# Patient Record
Sex: MALE | Race: WHITE | NOT HISPANIC OR LATINO | URBAN - METROPOLITAN AREA
[De-identification: names, ages, dates, MRNs, and addresses within clinical notes are randomized per-mention and may not be internally consistent; named-entity substitution may affect disease eponyms.]

---

## 2017-03-03 ENCOUNTER — OUTPATIENT (OUTPATIENT)
Dept: INPATIENT UNIT | Facility: HOSPITAL | Age: 57
LOS: 1 days | End: 2017-03-03
Payer: COMMERCIAL

## 2017-03-03 ENCOUNTER — TRANSCRIPTION ENCOUNTER (OUTPATIENT)
Age: 57
End: 2017-03-03

## 2017-03-03 VITALS
WEIGHT: 220.02 LBS | DIASTOLIC BLOOD PRESSURE: 87 MMHG | HEART RATE: 71 BPM | TEMPERATURE: 99 F | RESPIRATION RATE: 18 BRPM | SYSTOLIC BLOOD PRESSURE: 160 MMHG | HEIGHT: 66 IN | OXYGEN SATURATION: 99 %

## 2017-03-03 DIAGNOSIS — Z45.02 ENCOUNTER FOR ADJUSTMENT AND MANAGEMENT OF AUTOMATIC IMPLANTABLE CARDIAC DEFIBRILLATOR: ICD-10-CM

## 2017-03-03 LAB
ALBUMIN SERPL ELPH-MCNC: 4.3 G/DL — SIGNIFICANT CHANGE UP (ref 3.3–5)
ALP SERPL-CCNC: 47 U/L — SIGNIFICANT CHANGE UP (ref 40–120)
ALT FLD-CCNC: 22 U/L RC — SIGNIFICANT CHANGE UP (ref 10–45)
ANION GAP SERPL CALC-SCNC: 15 MMOL/L — SIGNIFICANT CHANGE UP (ref 5–17)
AST SERPL-CCNC: 22 U/L — SIGNIFICANT CHANGE UP (ref 10–40)
BILIRUB SERPL-MCNC: 0.5 MG/DL — SIGNIFICANT CHANGE UP (ref 0.2–1.2)
BUN SERPL-MCNC: 20 MG/DL — SIGNIFICANT CHANGE UP (ref 7–23)
CALCIUM SERPL-MCNC: 9.1 MG/DL — SIGNIFICANT CHANGE UP (ref 8.4–10.5)
CHLORIDE SERPL-SCNC: 106 MMOL/L — SIGNIFICANT CHANGE UP (ref 96–108)
CO2 SERPL-SCNC: 23 MMOL/L — SIGNIFICANT CHANGE UP (ref 22–31)
CREAT SERPL-MCNC: 1.29 MG/DL — SIGNIFICANT CHANGE UP (ref 0.5–1.3)
GLUCOSE SERPL-MCNC: 101 MG/DL — HIGH (ref 70–99)
HCT VFR BLD CALC: 40.6 % — SIGNIFICANT CHANGE UP (ref 39–50)
HGB BLD-MCNC: 13.9 G/DL — SIGNIFICANT CHANGE UP (ref 13–17)
MCHC RBC-ENTMCNC: 28.6 PG — SIGNIFICANT CHANGE UP (ref 27–34)
MCHC RBC-ENTMCNC: 34.3 GM/DL — SIGNIFICANT CHANGE UP (ref 32–36)
MCV RBC AUTO: 83.6 FL — SIGNIFICANT CHANGE UP (ref 80–100)
PLATELET # BLD AUTO: 144 K/UL — LOW (ref 150–400)
POTASSIUM SERPL-MCNC: 3.7 MMOL/L — SIGNIFICANT CHANGE UP (ref 3.5–5.3)
POTASSIUM SERPL-SCNC: 3.7 MMOL/L — SIGNIFICANT CHANGE UP (ref 3.5–5.3)
PROT SERPL-MCNC: 6.9 G/DL — SIGNIFICANT CHANGE UP (ref 6–8.3)
RBC # BLD: 4.86 M/UL — SIGNIFICANT CHANGE UP (ref 4.2–5.8)
RBC # FLD: 12.8 % — SIGNIFICANT CHANGE UP (ref 10.3–14.5)
SODIUM SERPL-SCNC: 144 MMOL/L — SIGNIFICANT CHANGE UP (ref 135–145)
WBC # BLD: 6.3 K/UL — SIGNIFICANT CHANGE UP (ref 3.8–10.5)
WBC # FLD AUTO: 6.3 K/UL — SIGNIFICANT CHANGE UP (ref 3.8–10.5)

## 2017-03-03 PROCEDURE — 33228 REMV&REPLC PM GEN DUAL LEAD: CPT

## 2017-03-03 PROCEDURE — 93010 ELECTROCARDIOGRAM REPORT: CPT

## 2017-03-03 RX ORDER — ATORVASTATIN CALCIUM 80 MG/1
10 TABLET, FILM COATED ORAL AT BEDTIME
Qty: 0 | Refills: 0 | Status: DISCONTINUED | OUTPATIENT
Start: 2017-03-03 | End: 2017-03-04

## 2017-03-03 RX ORDER — ACETAMINOPHEN 500 MG
2 TABLET ORAL
Qty: 0 | Refills: 0 | COMMUNITY
Start: 2017-03-03

## 2017-03-03 RX ORDER — PANTOPRAZOLE SODIUM 20 MG/1
40 TABLET, DELAYED RELEASE ORAL
Qty: 0 | Refills: 0 | Status: DISCONTINUED | OUTPATIENT
Start: 2017-03-03 | End: 2017-03-04

## 2017-03-03 RX ORDER — CEPHALEXIN 500 MG
1 CAPSULE ORAL
Qty: 3 | Refills: 0 | OUTPATIENT
Start: 2017-03-03 | End: 2017-03-04

## 2017-03-03 RX ORDER — SODIUM CHLORIDE 9 MG/ML
3 INJECTION INTRAMUSCULAR; INTRAVENOUS; SUBCUTANEOUS EVERY 8 HOURS
Qty: 0 | Refills: 0 | Status: DISCONTINUED | OUTPATIENT
Start: 2017-03-03 | End: 2017-03-04

## 2017-03-03 RX ORDER — CEFAZOLIN SODIUM 1 G
1000 VIAL (EA) INJECTION EVERY 8 HOURS
Qty: 0 | Refills: 0 | Status: COMPLETED | OUTPATIENT
Start: 2017-03-04 | End: 2017-03-04

## 2017-03-03 RX ORDER — ACETAMINOPHEN 500 MG
650 TABLET ORAL EVERY 6 HOURS
Qty: 0 | Refills: 0 | Status: DISCONTINUED | OUTPATIENT
Start: 2017-03-03 | End: 2017-03-04

## 2017-03-03 RX ORDER — INFLUENZA VIRUS VACCINE 15; 15; 15; 15 UG/.5ML; UG/.5ML; UG/.5ML; UG/.5ML
0.5 SUSPENSION INTRAMUSCULAR ONCE
Qty: 0 | Refills: 0 | Status: COMPLETED | OUTPATIENT
Start: 2017-03-03 | End: 2017-03-03

## 2017-03-03 RX ADMIN — SODIUM CHLORIDE 3 MILLILITER(S): 9 INJECTION INTRAMUSCULAR; INTRAVENOUS; SUBCUTANEOUS at 21:39

## 2017-03-03 RX ADMIN — ATORVASTATIN CALCIUM 10 MILLIGRAM(S): 80 TABLET, FILM COATED ORAL at 22:42

## 2017-03-03 NOTE — H&P CARDIOLOGY - PMH
Arthritis  Family history of coronary artery disease  Allergic asthma  GERD (gastroesophageal reflux disease)  Bradycardia  Pacemaker  Syncope

## 2017-03-03 NOTE — DISCHARGE NOTE ADULT - CARE PROVIDER_API CALL
Elroy Mauricio), Cardiac Electrophysiology; Cardiology  [address] [place] [address]  Phone: [phone]  Fax: [phone]

## 2017-03-03 NOTE — DISCHARGE NOTE ADULT - CARE PROVIDERS DIRECT ADDRESSES
[email or web address]

## 2017-03-03 NOTE — DISCHARGE NOTE ADULT - CARE PLAN
Principal Discharge DX:	Pacemaker battery depletion  Goal:	PPM generator changed  Instructions for follow-up, activity and diet:	No lifting of affected arm over your head on the side of pacemaker for 2 weeks. No lifting / pushing more than 10 lbs for 6 weeks. No driving until you have your follow up appointment. Keep area dry for 5-7 days, then you may shower, but do not scrub the area. No submerging / swimming until the scar is formed. No golf / swimming / tennis for 6 weeks or until you are cleared by your physcian.  Do not take NSAIDS for pain, this includes Advil, Motrin, Aleve, Ibuprofen, Naprosyn as these can cause bleeding. Take Tylenol for pain as needed per label instructions. Please call [phone] for follow up appointment in 1-2 weeks after discharge. Principal Discharge DX:	Pacemaker battery depletion  Goal:	PPM generator changed  Instructions for follow-up, activity and diet:	No lifting of affected arm over your head on the side of pacemaker for 2 weeks. No lifting / pushing more than 10 lbs for 6 weeks. No driving until you have your follow up appointment. Keep area dry for 5-7 days, then you may shower, but do not scrub the area. No submerging / swimming until the scar is formed. No golf / swimming / tennis for 6 weeks or until you are cleared by your physcian.  Do not take NSAIDS for pain, this includes Advil, Motrin, Aleve, Ibuprofen, Naprosyn as these can cause bleeding. Take Tylenol for pain as needed per label instructions. Please call [phone] for follow up appointment in 1-2 weeks after discharge. Principal Discharge DX:	Pacemaker battery depletion  Goal:	PPM generator changed  Instructions for follow-up, activity and diet:	No lifting of affected arm over your head on the side of pacemaker for 2 weeks. No lifting / pushing more than 10 lbs for 6 weeks. No driving until you have your follow up appointment. Keep area dry for 5-7 days, then you may shower, but do not scrub the area. No submerging / swimming until the scar is formed. No golf / swimming / tennis for 6 weeks or until you are cleared by your physcian.  Do not take NSAIDS for pain, this includes Advil, Motrin, Aleve, Ibuprofen, Naprosyn as these can cause bleeding. Take Tylenol for pain as needed per label instructions. Please call [phone] for follow up appointment in 1-2 weeks after discharge.

## 2017-03-03 NOTE — DISCHARGE NOTE ADULT - HOSPITAL COURSE
57 year old obese male pt with PMHx syncope s/p PPM, FH CAD, GERD, Allergic Asthma, here for PPM generator change. Pt denies dizziness or light headed.   Pt s/p PPM generator change via L upper chest wall. Pt tolerated procedure well with no post complications and hospitalization remained uneventful. Pt is hemodynamically stable and insertion/incision site benign. No c/o chest pain or SOB. Discharge teaching provided to Pt/caretaker and verbalized understanding the instruction. Pt is stable for discharge home as per attending. 57 year old obese male pt with PMHx syncope s/p PPM, FH CAD, GERD, Allergic Asthma, here for PPM generator change. Pt denies dizziness or light headed.   Pt s/p PPM generator change via L upper chest wall. Pt tolerated procedure well with no post complications and hospitalization remained uneventful. Pt is hemodynamically stable and insertion/incision site benign. No c/o chest pain or SOB. Discharge teaching provided to Pt/caretaker and verbalized understanding the instruction. Pt is stable for discharge home as per attending Dr Mauricio

## 2017-03-03 NOTE — DISCHARGE NOTE ADULT - NS AS ACTIVITY OBS
No Heavy lifting/straining/Do not drive or operate machinery Walking-Indoors allowed/Do not drive or operate machinery/No Heavy lifting/straining

## 2017-03-03 NOTE — DISCHARGE NOTE ADULT - MEDICATION SUMMARY - MEDICATIONS TO TAKE
I will START or STAY ON the medications listed below when I get home from the hospital:    acetaminophen 325 mg oral tablet  -- 2 tab(s) by mouth every 6 hours, As needed, Mild Pain (1 - 3)  -- Indication: For pain    pravastatin 10 mg oral tablet  -- 1 tab(s) by mouth once a day  -- Indication: For hld    cephalexin 500 mg oral capsule  -- 1 cap(s) by mouth every 8 hours  -- Finish all this medication unless otherwise directed by prescriber.    -- Indication: For atb    Protonix 40 mg oral delayed release tablet  -- 1 tab(s) by mouth once a day  -- Indication: For home med I will START or STAY ON the medications listed below when I get home from the hospital:    acetaminophen 325 mg oral tablet  -- 2 tab(s) by mouth every 6 hours, As needed, Mild Pain (1 - 3)  -- Indication: For pain    pravastatin 10 mg oral tablet  -- 1 tab(s) by mouth once a day  -- Indication: For hld    cephalexin 500 mg oral capsule  -- 1 cap(s) by mouth every 8 hours  -- Finish all this medication unless otherwise directed by prescriber.    -- Indication: For antibiotic    Protonix 40 mg oral delayed release tablet  -- 1 tab(s) by mouth once a day  -- Indication: For home med

## 2017-03-03 NOTE — DISCHARGE NOTE ADULT - PLAN OF CARE
PPM generator changed No lifting of affected arm over your head on the side of pacemaker for 2 weeks. No lifting / pushing more than 10 lbs for 6 weeks. No driving until you have your follow up appointment. Keep area dry for 5-7 days, then you may shower, but do not scrub the area. No submerging / swimming until the scar is formed. No golf / swimming / tennis for 6 weeks or until you are cleared by your physcian.  Do not take NSAIDS for pain, this includes Advil, Motrin, Aleve, Ibuprofen, Naprosyn as these can cause bleeding. Take Tylenol for pain as needed per label instructions. Please call [phone] for follow up appointment in 1-2 weeks after discharge.

## 2017-03-04 VITALS
OXYGEN SATURATION: 98 % | DIASTOLIC BLOOD PRESSURE: 83 MMHG | SYSTOLIC BLOOD PRESSURE: 143 MMHG | RESPIRATION RATE: 18 BRPM | TEMPERATURE: 99 F | HEART RATE: 63 BPM

## 2017-03-04 LAB
ANION GAP SERPL CALC-SCNC: 13 MMOL/L — SIGNIFICANT CHANGE UP (ref 5–17)
BUN SERPL-MCNC: 22 MG/DL — SIGNIFICANT CHANGE UP (ref 7–23)
CALCIUM SERPL-MCNC: 8.8 MG/DL — SIGNIFICANT CHANGE UP (ref 8.4–10.5)
CHLORIDE SERPL-SCNC: 106 MMOL/L — SIGNIFICANT CHANGE UP (ref 96–108)
CO2 SERPL-SCNC: 25 MMOL/L — SIGNIFICANT CHANGE UP (ref 22–31)
CREAT SERPL-MCNC: 1.42 MG/DL — HIGH (ref 0.5–1.3)
GLUCOSE SERPL-MCNC: 118 MG/DL — HIGH (ref 70–99)
HCT VFR BLD CALC: 38.2 % — LOW (ref 39–50)
HCT VFR BLD CALC: 39.3 % — SIGNIFICANT CHANGE UP (ref 39–50)
HGB BLD-MCNC: 12.9 G/DL — LOW (ref 13–17)
HGB BLD-MCNC: 13.3 G/DL — SIGNIFICANT CHANGE UP (ref 13–17)
MCHC RBC-ENTMCNC: 28.5 PG — SIGNIFICANT CHANGE UP (ref 27–34)
MCHC RBC-ENTMCNC: 28.5 PG — SIGNIFICANT CHANGE UP (ref 27–34)
MCHC RBC-ENTMCNC: 33.9 GM/DL — SIGNIFICANT CHANGE UP (ref 32–36)
MCHC RBC-ENTMCNC: 33.9 GM/DL — SIGNIFICANT CHANGE UP (ref 32–36)
MCV RBC AUTO: 84.1 FL — SIGNIFICANT CHANGE UP (ref 80–100)
MCV RBC AUTO: 84.1 FL — SIGNIFICANT CHANGE UP (ref 80–100)
PLATELET # BLD AUTO: 140 K/UL — LOW (ref 150–400)
PLATELET # BLD AUTO: 143 K/UL — LOW (ref 150–400)
POTASSIUM SERPL-MCNC: 4 MMOL/L — SIGNIFICANT CHANGE UP (ref 3.5–5.3)
POTASSIUM SERPL-SCNC: 4 MMOL/L — SIGNIFICANT CHANGE UP (ref 3.5–5.3)
RBC # BLD: 4.54 M/UL — SIGNIFICANT CHANGE UP (ref 4.2–5.8)
RBC # BLD: 4.67 M/UL — SIGNIFICANT CHANGE UP (ref 4.2–5.8)
RBC # FLD: 13 % — SIGNIFICANT CHANGE UP (ref 10.3–14.5)
RBC # FLD: 13 % — SIGNIFICANT CHANGE UP (ref 10.3–14.5)
SODIUM SERPL-SCNC: 144 MMOL/L — SIGNIFICANT CHANGE UP (ref 135–145)
WBC # BLD: 5.9 K/UL — SIGNIFICANT CHANGE UP (ref 3.8–10.5)
WBC # BLD: 7 K/UL — SIGNIFICANT CHANGE UP (ref 3.8–10.5)
WBC # FLD AUTO: 5.9 K/UL — SIGNIFICANT CHANGE UP (ref 3.8–10.5)
WBC # FLD AUTO: 7 K/UL — SIGNIFICANT CHANGE UP (ref 3.8–10.5)

## 2017-03-04 PROCEDURE — 80048 BASIC METABOLIC PNL TOTAL CA: CPT

## 2017-03-04 PROCEDURE — C1785: CPT

## 2017-03-04 PROCEDURE — 33228 REMV&REPLC PM GEN DUAL LEAD: CPT

## 2017-03-04 PROCEDURE — 93005 ELECTROCARDIOGRAM TRACING: CPT

## 2017-03-04 PROCEDURE — 93010 ELECTROCARDIOGRAM REPORT: CPT

## 2017-03-04 PROCEDURE — 85027 COMPLETE CBC AUTOMATED: CPT

## 2017-03-04 PROCEDURE — 80053 COMPREHEN METABOLIC PANEL: CPT

## 2017-03-04 RX ADMIN — PANTOPRAZOLE SODIUM 40 MILLIGRAM(S): 20 TABLET, DELAYED RELEASE ORAL at 05:33

## 2017-03-04 RX ADMIN — Medication 100 MILLIGRAM(S): at 09:26

## 2017-03-04 RX ADMIN — Medication 100 MILLIGRAM(S): at 00:38

## 2017-03-04 RX ADMIN — SODIUM CHLORIDE 3 MILLILITER(S): 9 INJECTION INTRAMUSCULAR; INTRAVENOUS; SUBCUTANEOUS at 05:30

## 2017-03-04 RX ADMIN — SODIUM CHLORIDE 3 MILLILITER(S): 9 INJECTION INTRAMUSCULAR; INTRAVENOUS; SUBCUTANEOUS at 14:32

## 2017-03-06 RX ORDER — PANTOPRAZOLE SODIUM 20 MG/1
1 TABLET, DELAYED RELEASE ORAL
Qty: 0 | Refills: 0 | COMMUNITY

## 2017-03-13 ENCOUNTER — APPOINTMENT (OUTPATIENT)
Dept: ORTHOPEDIC SURGERY | Facility: CLINIC | Age: 57
End: 2017-03-13

## 2017-03-17 ENCOUNTER — APPOINTMENT (OUTPATIENT)
Dept: ELECTROPHYSIOLOGY | Facility: CLINIC | Age: 57
End: 2017-03-17

## 2017-03-17 ENCOUNTER — NON-APPOINTMENT (OUTPATIENT)
Age: 57
End: 2017-03-17

## 2017-03-17 VITALS
BODY MASS INDEX: 35.36 KG/M2 | DIASTOLIC BLOOD PRESSURE: 88 MMHG | HEART RATE: 65 BPM | OXYGEN SATURATION: 98 % | SYSTOLIC BLOOD PRESSURE: 154 MMHG | WEIGHT: 220 LBS | HEIGHT: 66 IN

## 2017-03-17 DIAGNOSIS — I44.2 ATRIOVENTRICULAR BLOCK, COMPLETE: ICD-10-CM

## 2017-03-17 DIAGNOSIS — Z95.0 PRESENCE OF CARDIAC PACEMAKER: ICD-10-CM

## 2017-11-07 ENCOUNTER — APPOINTMENT (OUTPATIENT)
Dept: ORTHOPEDIC SURGERY | Facility: CLINIC | Age: 57
End: 2017-11-07

## 2017-11-30 ENCOUNTER — APPOINTMENT (OUTPATIENT)
Dept: RHEUMATOLOGY | Facility: CLINIC | Age: 57
End: 2017-11-30
Payer: COMMERCIAL

## 2017-11-30 VITALS
OXYGEN SATURATION: 98 % | BODY MASS INDEX: 35.36 KG/M2 | SYSTOLIC BLOOD PRESSURE: 135 MMHG | WEIGHT: 220 LBS | HEART RATE: 66 BPM | TEMPERATURE: 98.8 F | HEIGHT: 66 IN | DIASTOLIC BLOOD PRESSURE: 77 MMHG

## 2017-11-30 DIAGNOSIS — Z78.9 OTHER SPECIFIED HEALTH STATUS: ICD-10-CM

## 2017-11-30 DIAGNOSIS — Z86.39 PERSONAL HISTORY OF OTHER ENDOCRINE, NUTRITIONAL AND METABOLIC DISEASE: ICD-10-CM

## 2017-11-30 PROCEDURE — 99204 OFFICE O/P NEW MOD 45 MIN: CPT

## 2017-11-30 RX ORDER — TADALAFIL 5 MG/1
5 TABLET, FILM COATED ORAL
Refills: 0 | Status: ACTIVE | COMMUNITY

## 2018-03-06 ENCOUNTER — APPOINTMENT (OUTPATIENT)
Dept: RHEUMATOLOGY | Facility: CLINIC | Age: 58
End: 2018-03-06
Payer: COMMERCIAL

## 2018-03-06 VITALS
OXYGEN SATURATION: 96 % | SYSTOLIC BLOOD PRESSURE: 154 MMHG | DIASTOLIC BLOOD PRESSURE: 94 MMHG | WEIGHT: 220 LBS | BODY MASS INDEX: 35.36 KG/M2 | HEART RATE: 75 BPM | HEIGHT: 66 IN

## 2018-03-06 PROCEDURE — 99214 OFFICE O/P EST MOD 30 MIN: CPT

## 2018-05-01 ENCOUNTER — APPOINTMENT (OUTPATIENT)
Dept: RHEUMATOLOGY | Facility: CLINIC | Age: 58
End: 2018-05-01
Payer: COMMERCIAL

## 2018-05-01 VITALS
HEART RATE: 65 BPM | WEIGHT: 220 LBS | BODY MASS INDEX: 35.36 KG/M2 | OXYGEN SATURATION: 95 % | DIASTOLIC BLOOD PRESSURE: 84 MMHG | SYSTOLIC BLOOD PRESSURE: 140 MMHG | HEIGHT: 66 IN

## 2018-05-01 DIAGNOSIS — M79.1 MYALGIA: ICD-10-CM

## 2018-05-01 DIAGNOSIS — M75.90 SHOULDER LESION, UNSPECIFIED, UNSPECIFIED SHOULDER: ICD-10-CM

## 2018-05-01 PROCEDURE — 99214 OFFICE O/P EST MOD 30 MIN: CPT

## 2018-05-01 RX ORDER — SIMVASTATIN 10 MG/1
10 TABLET, FILM COATED ORAL
Refills: 0 | Status: DISCONTINUED | COMMUNITY
End: 2018-05-01

## 2018-05-01 RX ORDER — SUCRALFATE 1 G/1
1 TABLET ORAL
Qty: 120 | Refills: 0 | Status: DISCONTINUED | COMMUNITY
Start: 2017-11-15

## 2018-05-01 RX ORDER — AMOXICILLIN AND CLAVULANATE POTASSIUM 875; 125 MG/1; MG/1
875-125 TABLET, COATED ORAL
Qty: 20 | Refills: 0 | Status: COMPLETED | COMMUNITY
Start: 2018-02-01

## 2018-06-28 ENCOUNTER — APPOINTMENT (OUTPATIENT)
Dept: RHEUMATOLOGY | Facility: CLINIC | Age: 58
End: 2018-06-28
Payer: COMMERCIAL

## 2018-06-28 VITALS
SYSTOLIC BLOOD PRESSURE: 152 MMHG | WEIGHT: 220 LBS | BODY MASS INDEX: 35.36 KG/M2 | HEART RATE: 72 BPM | HEIGHT: 66 IN | DIASTOLIC BLOOD PRESSURE: 90 MMHG | OXYGEN SATURATION: 95 %

## 2018-06-28 DIAGNOSIS — M25.561 PAIN IN RIGHT KNEE: ICD-10-CM

## 2018-06-28 DIAGNOSIS — M25.562 PAIN IN RIGHT KNEE: ICD-10-CM

## 2018-06-28 PROCEDURE — 20610 DRAIN/INJ JOINT/BURSA W/O US: CPT | Mod: LT

## 2018-06-28 PROCEDURE — 99213 OFFICE O/P EST LOW 20 MIN: CPT | Mod: 25

## 2018-06-28 RX ORDER — METHYLPREDNISOLONE ACETATE 80 MG/ML
80 INJECTION, SUSPENSION INTRA-ARTICULAR; INTRALESIONAL; INTRAMUSCULAR; SOFT TISSUE
Refills: 0 | Status: COMPLETED | OUTPATIENT
Start: 2018-06-28

## 2018-06-28 RX ADMIN — METHYLPREDNISOLONE ACETATE MG/ML: 80 INJECTION, SUSPENSION INTRA-ARTICULAR; INTRALESIONAL; INTRAMUSCULAR; SOFT TISSUE at 00:00

## 2018-08-07 ENCOUNTER — APPOINTMENT (OUTPATIENT)
Dept: RHEUMATOLOGY | Facility: CLINIC | Age: 58
End: 2018-08-07
Payer: COMMERCIAL

## 2018-08-07 VITALS
HEIGHT: 66 IN | SYSTOLIC BLOOD PRESSURE: 150 MMHG | DIASTOLIC BLOOD PRESSURE: 91 MMHG | HEART RATE: 71 BPM | OXYGEN SATURATION: 97 % | WEIGHT: 220 LBS | BODY MASS INDEX: 35.36 KG/M2

## 2018-08-07 PROCEDURE — 99214 OFFICE O/P EST MOD 30 MIN: CPT

## 2018-08-07 RX ORDER — KETOCONAZOLE 20 MG/G
2 CREAM TOPICAL
Qty: 30 | Refills: 0 | Status: DISCONTINUED | COMMUNITY
Start: 2018-03-21 | End: 2018-08-07

## 2018-08-07 RX ORDER — PANTOPRAZOLE SODIUM 40 MG/1
40 TABLET, DELAYED RELEASE ORAL
Refills: 0 | Status: DISCONTINUED | COMMUNITY
End: 2018-08-07

## 2018-08-16 ENCOUNTER — OTHER (OUTPATIENT)
Age: 58
End: 2018-08-16

## 2018-08-16 RX ORDER — HYALURONATE SODIUM 10 MG/ML
20 VIAL (ML) INTRAARTICULAR
Qty: 10 | Refills: 0 | Status: DISCONTINUED | COMMUNITY
Start: 2018-08-07 | End: 2018-08-16

## 2018-08-17 ENCOUNTER — MOBILE ON CALL (OUTPATIENT)
Age: 58
End: 2018-08-17

## 2018-08-23 ENCOUNTER — OTHER (OUTPATIENT)
Age: 58
End: 2018-08-23

## 2018-08-27 ENCOUNTER — MEDICATION RENEWAL (OUTPATIENT)
Age: 58
End: 2018-08-27

## 2018-08-28 ENCOUNTER — OTHER (OUTPATIENT)
Age: 58
End: 2018-08-28

## 2018-09-06 ENCOUNTER — APPOINTMENT (OUTPATIENT)
Dept: RHEUMATOLOGY | Facility: CLINIC | Age: 58
End: 2018-09-06
Payer: COMMERCIAL

## 2018-09-11 ENCOUNTER — APPOINTMENT (OUTPATIENT)
Dept: RHEUMATOLOGY | Facility: CLINIC | Age: 58
End: 2018-09-11
Payer: COMMERCIAL

## 2018-09-11 VITALS
BODY MASS INDEX: 35.36 KG/M2 | OXYGEN SATURATION: 98 % | SYSTOLIC BLOOD PRESSURE: 161 MMHG | HEIGHT: 66 IN | DIASTOLIC BLOOD PRESSURE: 94 MMHG | WEIGHT: 220 LBS | HEART RATE: 89 BPM

## 2018-09-11 PROCEDURE — 20610 DRAIN/INJ JOINT/BURSA W/O US: CPT | Mod: 50

## 2018-09-11 RX ORDER — SODIUM HYALURONATE INTRA-ARTICULAR SOLN PREF SYR 25 MG/2.5ML 25/2.5 MG/ML
25 SOLUTION PREFILLED SYRINGE INTRAARTICULAR
Refills: 0 | Status: COMPLETED | OUTPATIENT
Start: 2018-09-11

## 2018-09-18 ENCOUNTER — APPOINTMENT (OUTPATIENT)
Dept: RHEUMATOLOGY | Facility: CLINIC | Age: 58
End: 2018-09-18
Payer: COMMERCIAL

## 2018-09-18 VITALS
SYSTOLIC BLOOD PRESSURE: 136 MMHG | DIASTOLIC BLOOD PRESSURE: 81 MMHG | WEIGHT: 220 LBS | HEIGHT: 66 IN | OXYGEN SATURATION: 98 % | HEART RATE: 84 BPM | BODY MASS INDEX: 35.36 KG/M2

## 2018-09-18 PROCEDURE — 20610 DRAIN/INJ JOINT/BURSA W/O US: CPT | Mod: 50

## 2018-09-18 RX ORDER — SODIUM HYALURONATE INTRA-ARTICULAR SOLN PREF SYR 25 MG/2.5ML 25/2.5 MG/ML
25 SOLUTION PREFILLED SYRINGE INTRAARTICULAR
Refills: 0 | Status: COMPLETED | OUTPATIENT
Start: 2018-09-18

## 2018-09-18 RX ADMIN — SODIUM HYALURONATE INTRA-ARTICULAR SOLN PREF SYR 25 MG/2.5ML MG/2.5ML: 25/2.5 SOLUTION PREFILLED SYRINGE at 00:00

## 2018-09-20 LAB
B PERT IGG+IGM PNL SER: CLEAR
COLOR FLD: NORMAL
FLUID INTAKE SUBSTANCE CLASS: NORMAL
LYMPHOCYTES # FLD MANUAL: 5 %
MONOS+MACROS NFR FLD MANUAL: 79 %
NEUTS SEG # FLD MANUAL: 16 %
RBC # FLD MANUAL: 9 /UL
SYCRY CLARITY: CLEAR
SYCRY COLOR: YELLOW
SYCRY ID: NORMAL
SYCRY TUBE: NORMAL
TOTAL CELLS COUNTED FLD: 219 /UL
TUBE TYPE: NORMAL

## 2018-09-25 ENCOUNTER — APPOINTMENT (OUTPATIENT)
Dept: RHEUMATOLOGY | Facility: CLINIC | Age: 58
End: 2018-09-25
Payer: COMMERCIAL

## 2018-09-25 VITALS
SYSTOLIC BLOOD PRESSURE: 160 MMHG | HEART RATE: 80 BPM | OXYGEN SATURATION: 95 % | BODY MASS INDEX: 35.36 KG/M2 | HEIGHT: 66 IN | DIASTOLIC BLOOD PRESSURE: 80 MMHG | WEIGHT: 220 LBS

## 2018-09-25 PROCEDURE — 20610 DRAIN/INJ JOINT/BURSA W/O US: CPT | Mod: 50

## 2018-09-25 RX ORDER — SODIUM HYALURONATE INTRA-ARTICULAR SOLN PREF SYR 25 MG/2.5ML 25/2.5 MG/ML
25 SOLUTION PREFILLED SYRINGE INTRAARTICULAR
Refills: 0 | Status: COMPLETED | OUTPATIENT
Start: 2018-09-25

## 2018-09-25 RX ADMIN — SODIUM HYALURONATE INTRA-ARTICULAR SOLN PREF SYR 25 MG/2.5ML MG/2.5ML: 25/2.5 SOLUTION PREFILLED SYRINGE at 00:00

## 2018-10-19 ENCOUNTER — APPOINTMENT (OUTPATIENT)
Dept: RHEUMATOLOGY | Facility: CLINIC | Age: 58
End: 2018-10-19

## 2018-10-23 ENCOUNTER — APPOINTMENT (OUTPATIENT)
Dept: RHEUMATOLOGY | Facility: CLINIC | Age: 58
End: 2018-10-23
Payer: COMMERCIAL

## 2018-10-23 VITALS
BODY MASS INDEX: 35.36 KG/M2 | WEIGHT: 220 LBS | HEIGHT: 66 IN | DIASTOLIC BLOOD PRESSURE: 80 MMHG | OXYGEN SATURATION: 98 % | SYSTOLIC BLOOD PRESSURE: 134 MMHG | HEART RATE: 73 BPM

## 2018-10-23 PROCEDURE — 99213 OFFICE O/P EST LOW 20 MIN: CPT | Mod: 25

## 2018-10-23 PROCEDURE — 20610 DRAIN/INJ JOINT/BURSA W/O US: CPT | Mod: LT

## 2018-10-24 RX ORDER — METHYLPREDNISOLONE ACETATE 80 MG/ML
80 INJECTION, SUSPENSION INTRA-ARTICULAR; INTRALESIONAL; INTRAMUSCULAR; SOFT TISSUE
Qty: 1 | Refills: 0 | Status: COMPLETED | OUTPATIENT
Start: 2018-10-24

## 2018-11-15 ENCOUNTER — MEDICATION RENEWAL (OUTPATIENT)
Age: 58
End: 2018-11-15

## 2018-12-19 ENCOUNTER — RX RENEWAL (OUTPATIENT)
Age: 58
End: 2018-12-19

## 2019-03-25 ENCOUNTER — RX RENEWAL (OUTPATIENT)
Age: 59
End: 2019-03-25

## 2019-04-02 ENCOUNTER — APPOINTMENT (OUTPATIENT)
Dept: RHEUMATOLOGY | Facility: CLINIC | Age: 59
End: 2019-04-02
Payer: COMMERCIAL

## 2019-04-02 VITALS
HEART RATE: 75 BPM | WEIGHT: 220 LBS | BODY MASS INDEX: 35.36 KG/M2 | SYSTOLIC BLOOD PRESSURE: 150 MMHG | OXYGEN SATURATION: 98 % | DIASTOLIC BLOOD PRESSURE: 90 MMHG | HEIGHT: 66 IN

## 2019-04-02 PROCEDURE — 20610 DRAIN/INJ JOINT/BURSA W/O US: CPT | Mod: LT

## 2019-04-02 PROCEDURE — 99214 OFFICE O/P EST MOD 30 MIN: CPT | Mod: 25

## 2019-04-02 RX ORDER — METHYLPREDNISOLONE ACETATE 80 MG/ML
80 INJECTION, SUSPENSION INTRA-ARTICULAR; INTRALESIONAL; INTRAMUSCULAR; SOFT TISSUE
Refills: 0 | Status: COMPLETED | OUTPATIENT
Start: 2019-04-02

## 2019-04-02 RX ADMIN — METHYLPREDNISOLONE ACETATE MG/ML: 80 INJECTION, SUSPENSION INTRA-ARTICULAR; INTRALESIONAL; INTRAMUSCULAR; SOFT TISSUE at 00:00

## 2019-04-02 NOTE — ASSESSMENT
[FreeTextEntry1] : 58 y/o man with hx of HLD, GERD, pacemaker, no hx of kidney stones, presents for evaluation and management of his gout, OA, and pes anserine bursitis. Currently his gout is stable. He has osteoarthritis b/L knees and had previously completed HA injections with good response. His pes anserine bursitis left knee is flaring. \par \par Plan:\par -cont colchicine for prn use for gout flare. He has only had one major attack total.  Since last visit, he had a mini flareup which responded well to colchicine for a couple of days.  His uric acid is 8.9.  Should he start requiring colchicine more frequently, we may need to implement Urate lowering therapy. \par -Counseled on foods to avoid. \par -Will put in order for Supartz injections.  He would like these again.  \par -XRs knees 7/24/18 reviewed.  Will order new set if required for authorization.  \par -pes anserine bursitis Left today with 80mg depomedrol, 4/2/19.\par -Rx topical diclofenac to use up to 4x/day. R/B/As discussed.\par -Cont tizanidine up to 4mg QHS for myalgias. Can add 2mg daytime dose if needed. \par -Massage therapy requisition given previously\par \par f/u in a few weeks for supartz\par \par

## 2019-04-02 NOTE — PROCEDURE
[FreeTextEntry1] : Procedure: L pes anserine bursa injection\par Date: 4/02/2019 \par Indications: therapeutic purposes. \par \par The procedure risks was discussed with the patient.\par Indications: therapeutic purposes \par #1 site identified in the Left pes anserine bursa . Verbal consent was obtained. \par Anesthesia was with ethyl chloride.\par The patient was prepped with betadine solution. \par A 25 gauge 1.5 inch needle was used.\par Injectables: Depomedrol 80 mg was injected into the site The Depomedrol was mixed with 1mL of xylocaine 1%. \par The patient tolerated the procedure well..\par There were no complications. Handouts/patient instructions were given to patient . patient was instructed to call if redness at site, a decrease in range of motion or an increase in pain is noted after procedure. \par  \par \par

## 2019-04-02 NOTE — HISTORY OF PRESENT ILLNESS
[FreeTextEntry1] : 60 y/o man with hx of PPM, HLD, GERD, presents for f/u of gout and OA. Patient states he wanted to see if he could try dietary changes first, and monitor his uric acid. I gave him colchicine to take prn last visit. \par Previously did not want to take any urate lowering medication. \par His most recent uric acid is 8.9. 4/2/19\par \par Knees are 3-4/10 in pain. Walking up and down the stairs makes it worse\par The Gel shots helped somewhat.\par Requesting left knee injection today.  I have previously injected his left Pes anserine bursa with good response.\par Voltaren topical gel, he is happy with, uses it once/day.\par \par Had right big toe a little smoldering.  he took a colchicine\par \par Only uses tizanidine as needed.  Has not really required it too much lately.  \par \par \par \par

## 2019-04-30 ENCOUNTER — APPOINTMENT (OUTPATIENT)
Dept: RHEUMATOLOGY | Facility: CLINIC | Age: 59
End: 2019-04-30
Payer: COMMERCIAL

## 2019-04-30 VITALS
HEART RATE: 87 BPM | OXYGEN SATURATION: 95 % | WEIGHT: 220 LBS | HEIGHT: 66 IN | BODY MASS INDEX: 35.36 KG/M2 | SYSTOLIC BLOOD PRESSURE: 158 MMHG | DIASTOLIC BLOOD PRESSURE: 84 MMHG

## 2019-04-30 PROCEDURE — 20610 DRAIN/INJ JOINT/BURSA W/O US: CPT | Mod: 50

## 2019-04-30 RX ORDER — SODIUM HYALURONATE INTRA-ARTICULAR SOLN PREF SYR 25 MG/2.5ML 25/2.5 MG/ML
25 SOLUTION PREFILLED SYRINGE INTRAARTICULAR
Refills: 0 | Status: COMPLETED | OUTPATIENT
Start: 2019-04-30

## 2019-04-30 RX ADMIN — SODIUM HYALURONATE INTRA-ARTICULAR SOLN PREF SYR 25 MG/2.5ML 0 MG/2.5ML: 25/2.5 SOLUTION PREFILLED SYRINGE at 00:00

## 2019-04-30 NOTE — PROCEDURE
[FreeTextEntry1] : Procedure: b/L knee Supartz injections\par Date: 04/30/2019 \par Indications: therapeutic purposes \par #1 site identified in the Right knee. \par # 2 site identified in the Left knee \par \par The procedure risks was discussed with the patient.\par Indications: therapeutic purposes \par #1 site identified in the Right Knee . Verbal consent was obtained. \par Anesthesia was with ethyl chloride.\par The patient was prepped with betadine solution. \par A 25 gauge 1.5 inch needle was used. 1% 1mL xylocaine was used for local anesthetic. \par Injectables: Supartz 25mg was injected to the site. \par The patient tolerated the procedure well..\par There were no complications. Handouts/patient instructions were given to patient . patient was instructed to call if redness at site, a decrease in range of motion or an increase in pain is noted after procedure. \par  \par Identical procedure was repeated for left knee. \par  \par

## 2019-05-02 ENCOUNTER — RX RENEWAL (OUTPATIENT)
Age: 59
End: 2019-05-02

## 2019-05-07 ENCOUNTER — APPOINTMENT (OUTPATIENT)
Dept: RHEUMATOLOGY | Facility: CLINIC | Age: 59
End: 2019-05-07
Payer: COMMERCIAL

## 2019-05-07 VITALS
OXYGEN SATURATION: 97 % | DIASTOLIC BLOOD PRESSURE: 83 MMHG | WEIGHT: 220 LBS | HEART RATE: 84 BPM | HEIGHT: 66 IN | BODY MASS INDEX: 35.36 KG/M2 | SYSTOLIC BLOOD PRESSURE: 148 MMHG

## 2019-05-07 PROCEDURE — 20610 DRAIN/INJ JOINT/BURSA W/O US: CPT

## 2019-05-07 RX ORDER — SODIUM HYALURONATE INTRA-ARTICULAR SOLN PREF SYR 25 MG/2.5ML 25/2.5 MG/ML
25 SOLUTION PREFILLED SYRINGE INTRAARTICULAR
Refills: 0 | Status: COMPLETED | OUTPATIENT
Start: 2019-05-07

## 2019-05-07 RX ADMIN — SODIUM HYALURONATE INTRA-ARTICULAR SOLN PREF SYR 25 MG/2.5ML 0 MG/2.5ML: 25/2.5 SOLUTION PREFILLED SYRINGE at 00:00

## 2019-05-07 NOTE — PROCEDURE
[FreeTextEntry1] : Procedure: b/L knee Supartz injections\par Date: 05/07/2019 \par Indications: therapeutic purposes \par #1 site identified in the Right knee. \par # 2 site identified in the Left knee \par \par The procedure risks was discussed with the patient.\par Indications: therapeutic purposes \par #1 site identified in the Right Knee . Verbal consent was obtained. \par Anesthesia was with ethyl chloride.\par The patient was prepped with betadine solution. \par A 25 gauge 1.5 inch needle was used. 1% 1mL xylocaine was used for local anesthetic. \par Injectables: Supartz 25mg was injected to the site. \par The patient tolerated the procedure well..\par There were no complications. Handouts/patient instructions were given to patient . patient was instructed to call if redness at site, a decrease in range of motion or an increase in pain is noted after procedure. \par  \par Identical procedure was repeated for left knee. \par  \par

## 2019-05-07 NOTE — ASSESSMENT
[FreeTextEntry1] : Injected b/L knees with Supartz\par f/u in 1 week for 3rd of 3 b/L supartz injections.

## 2019-05-14 ENCOUNTER — APPOINTMENT (OUTPATIENT)
Dept: RHEUMATOLOGY | Facility: CLINIC | Age: 59
End: 2019-05-14

## 2019-05-16 ENCOUNTER — APPOINTMENT (OUTPATIENT)
Dept: RHEUMATOLOGY | Facility: CLINIC | Age: 59
End: 2019-05-16
Payer: COMMERCIAL

## 2019-05-16 VITALS
WEIGHT: 220 LBS | DIASTOLIC BLOOD PRESSURE: 94 MMHG | SYSTOLIC BLOOD PRESSURE: 165 MMHG | OXYGEN SATURATION: 98 % | BODY MASS INDEX: 35.36 KG/M2 | HEART RATE: 94 BPM | HEIGHT: 66 IN

## 2019-05-16 PROCEDURE — 20610 DRAIN/INJ JOINT/BURSA W/O US: CPT | Mod: 50

## 2019-05-17 LAB
B PERT IGG+IGM PNL SER: CLEAR
COLOR FLD: NORMAL
FLUID INTAKE SUBSTANCE CLASS: NORMAL
LYMPHOCYTES # FLD MANUAL: 7 %
MONOS+MACROS NFR FLD MANUAL: 33 %
NEUTS SEG # FLD MANUAL: 60 %
RBC # FLD MANUAL: 2000 /UL
SYCRY CLARITY: CLEAR
SYCRY COLOR: YELLOW
SYCRY ID: ABNORMAL
SYCRY TUBE: NORMAL
TOTAL CELLS COUNTED FLD: 256 /UL
TUBE TYPE: NORMAL

## 2019-05-17 NOTE — PROCEDURE
[FreeTextEntry1] : Procedure: b/L knee Supartz injections\par Date: 05/16/2019 \par Indications: therapeutic purposes \par #1 site identified in the left knee. \par # 2 site identified in the right knee \par \par The procedure risks was discussed with the patient.\par Indications: therapeutic purposes \par #1 site identified in the left Knee . Verbal consent was obtained. \par Anesthesia was with ethyl chloride.\par The patient was prepped with betadine solution. \par A 25 gauge 1.5 inch needle was used. 1% 1mL xylocaine was used for local anesthetic. \par Injectables: Supartz 25mg was injected to the site. \par The patient tolerated the procedure well..\par There were no complications. Handouts/patient instructions were given to patient . patient was instructed to call if redness at site, a decrease in range of motion or an increase in pain is noted after procedure. \par  \par Identical procedure was repeated for right knee, but 5 cc of synovial fluid was aspirated from right knee prior to injecting Supartz.  Fluid was sent for cell count, crystal, and culture.  \par

## 2019-05-27 ENCOUNTER — RX RENEWAL (OUTPATIENT)
Age: 59
End: 2019-05-27

## 2019-06-01 LAB — BACTERIA FLD CULT: NORMAL

## 2019-08-29 ENCOUNTER — RX RENEWAL (OUTPATIENT)
Age: 59
End: 2019-08-29

## 2019-10-30 ENCOUNTER — RX RENEWAL (OUTPATIENT)
Age: 59
End: 2019-10-30

## 2019-12-17 ENCOUNTER — APPOINTMENT (OUTPATIENT)
Dept: RHEUMATOLOGY | Facility: CLINIC | Age: 59
End: 2019-12-17
Payer: COMMERCIAL

## 2019-12-17 VITALS
WEIGHT: 215 LBS | HEART RATE: 75 BPM | HEIGHT: 66 IN | BODY MASS INDEX: 34.55 KG/M2 | OXYGEN SATURATION: 96 % | DIASTOLIC BLOOD PRESSURE: 90 MMHG | SYSTOLIC BLOOD PRESSURE: 156 MMHG

## 2019-12-17 PROCEDURE — 99214 OFFICE O/P EST MOD 30 MIN: CPT

## 2019-12-17 NOTE — ASSESSMENT
[FreeTextEntry1] : 58 y/o man with hx of HLD, GERD, pacemaker, no hx of kidney stones, presents for evaluation and management of his gout. Currently his gout is stable. He has osteoarthritis b/L knees and will benefit from HA injections. His pes anserine bursitis left knee is improved with injection. \par \par Plan:\par - colchicine for prn use for gout flare. He has only had one major attack. \par -Counseled on foods to avoid. \par -Will order supartz again.  Does not want any CS injection today.  would like fluid removed when we do the supartz.  \par -XRs knees\par -can continue to hold off on uric acid lowering medication\par -Cautioned on use of advil and it's side effects.   Informed him meloxicam is also an NSAID and cannot take both advil and meloxicam at same time.  Patient understands.  Rx for meloxicam sent to pharmacy.  To use sparingly. \par -Will obtain labs from Dr. Cat.  \par -pes anserine bursitis Left s/p injection 6/28/18.  Does not need another one.  \par f/u 3 weeks for first injection. Call to confirm medication is available before first visit. \par routine f/u 3 months\par \par  \par Addendum: Received Dr. Cat's labs.  Cr 1.32.  Called patient to advised the topical NSAID is preferred to the oral NSAID, and if must use the oral NSAID, to use very sparingly, no more than 1-2x/week.  Will check labs again when he comes in for knee injections.  \par

## 2019-12-17 NOTE — HISTORY OF PRESENT ILLNESS
[FreeTextEntry1] : 60 y/o man with hx of PPM, HLD, GERD, presents for f/u of gout and OA. Patient states he wanted to see if he could try dietary changes first, and monitor his uric acid. I gave him colchicine to take prn last visit. \par Previously did not want to take any urate lowering medication. \par His most recent uric acid is 8 12/16/19, down from 8.9 4/2/19\par \par Knees are 2/10 in pain. Walking own the stairs makes it worse.  the pain is equal in both sides.  \par The Gel shots helped somewhat.\par  I have previously injected his left Pes anserine bursa with good response.\par Voltaren topical gel, he is happy with, uses it once/day.\par \gissel Had right big toe a little smoldering. He took a colchicine.  He has only required it one time/year.  \par \gissel Only uses tizanidine as needed. Has not really required it too much lately. \par \gissel Had BW with his PCP a efw months ago, Dr. Cat. Was told everything was ok.

## 2020-01-06 ENCOUNTER — OTHER (OUTPATIENT)
Age: 60
End: 2020-01-06

## 2020-01-10 ENCOUNTER — RX RENEWAL (OUTPATIENT)
Age: 60
End: 2020-01-10

## 2020-01-14 ENCOUNTER — APPOINTMENT (OUTPATIENT)
Dept: RHEUMATOLOGY | Facility: CLINIC | Age: 60
End: 2020-01-14
Payer: COMMERCIAL

## 2020-01-14 VITALS
HEART RATE: 83 BPM | HEIGHT: 66 IN | WEIGHT: 220 LBS | DIASTOLIC BLOOD PRESSURE: 83 MMHG | BODY MASS INDEX: 35.36 KG/M2 | OXYGEN SATURATION: 97 % | SYSTOLIC BLOOD PRESSURE: 145 MMHG

## 2020-01-14 PROCEDURE — 20610 DRAIN/INJ JOINT/BURSA W/O US: CPT | Mod: 50

## 2020-01-14 NOTE — ASSESSMENT
[FreeTextEntry1] : completed 1 of 3 b/L supartz injections\par f/u 1 week for 2nd set\par LOT [id number]

## 2020-01-14 NOTE — PROCEDURE
[FreeTextEntry1] : Procedure: b/L knee Supartz injections\par Date: 1/14/20\par Indications: therapeutic purposes \par #1 site identified in the left knee. \par # 2 site identified in the right knee \par \par The procedure risks was discussed with the patient.\par Indications: therapeutic purposes \par #1 site identified in the left Knee . Verbal consent was obtained. \par Anesthesia was with ethyl chloride.\par The patient was prepped with betadine solution. \par A 25 gauge 1.5 inch needle was used. 1% 1mL xylocaine was used for local anesthetic. \par Injectables: Supartz 25mg was injected to the site. \par The patient tolerated the procedure well..\par There were no complications. Handouts/patient instructions were given to patient . patient was instructed to call if redness at site, a decrease in range of motion or an increase in pain is noted after procedure. \par

## 2020-01-24 ENCOUNTER — APPOINTMENT (OUTPATIENT)
Dept: RHEUMATOLOGY | Facility: CLINIC | Age: 60
End: 2020-01-24
Payer: COMMERCIAL

## 2020-01-24 VITALS
HEART RATE: 79 BPM | TEMPERATURE: 97.9 F | DIASTOLIC BLOOD PRESSURE: 68 MMHG | WEIGHT: 220 LBS | OXYGEN SATURATION: 99 % | BODY MASS INDEX: 35.51 KG/M2 | SYSTOLIC BLOOD PRESSURE: 140 MMHG

## 2020-01-24 PROCEDURE — 20610 DRAIN/INJ JOINT/BURSA W/O US: CPT | Mod: 50

## 2020-01-24 NOTE — PROCEDURE
[FreeTextEntry1] : Procedure: b/L knee Supartz injections\par Date: 1/24/20\par Indications: therapeutic purposes \par #1 site identified in the left knee. \par # 2 site identified in the right knee \par \par The procedure risks was discussed with the patient.\par Indications: therapeutic purposes \par #1 site identified in the left Knee . Verbal consent was obtained. \par Anesthesia was with ethyl chloride.\par The patient was prepped with betadine solution. \par A 25 gauge 1.5 inch needle was used. 1% 1mL xylocaine was used for local anesthetic. \par Injectables: Supartz 25mg was injected to the site. \par The patient tolerated the procedure well..\par There were no complications. Handouts/patient instructions were given to patient . patient was instructed to call if redness at site, a decrease in range of motion or an increase in pain is noted after procedure. \par \par  \par

## 2020-01-24 NOTE — ASSESSMENT
[FreeTextEntry1] : completed 2 of 3 b/L supartz injections\par f/u 1 week for 3rd set\par LOT [id number]. \par

## 2020-01-28 ENCOUNTER — APPOINTMENT (OUTPATIENT)
Dept: RHEUMATOLOGY | Facility: CLINIC | Age: 60
End: 2020-01-28

## 2020-02-04 ENCOUNTER — APPOINTMENT (OUTPATIENT)
Dept: RHEUMATOLOGY | Facility: CLINIC | Age: 60
End: 2020-02-04

## 2020-02-07 ENCOUNTER — APPOINTMENT (OUTPATIENT)
Dept: RHEUMATOLOGY | Facility: CLINIC | Age: 60
End: 2020-02-07
Payer: COMMERCIAL

## 2020-02-07 VITALS
SYSTOLIC BLOOD PRESSURE: 165 MMHG | WEIGHT: 220 LBS | HEART RATE: 74 BPM | DIASTOLIC BLOOD PRESSURE: 90 MMHG | OXYGEN SATURATION: 98 % | TEMPERATURE: 97.9 F | BODY MASS INDEX: 35.51 KG/M2

## 2020-02-07 PROCEDURE — 20610 DRAIN/INJ JOINT/BURSA W/O US: CPT | Mod: 50

## 2020-02-07 PROCEDURE — 96372 THER/PROPH/DIAG INJ SC/IM: CPT | Mod: 59

## 2020-02-07 RX ORDER — KETOROLAC TROMETHAMINE 30 MG/ML
30 INJECTION, SOLUTION INTRAMUSCULAR; INTRAVENOUS
Qty: 1 | Refills: 0 | Status: COMPLETED | OUTPATIENT
Start: 2020-02-07

## 2020-02-07 RX ADMIN — KETOROLAC TROMETHAMINE 0 MG/ML: 30 INJECTION, SOLUTION INTRAMUSCULAR; INTRAVENOUS at 00:00

## 2020-02-07 NOTE — ASSESSMENT
[FreeTextEntry1] : Completed 3/3 supartz injections\par Injected toradol 30mg IM x 1 to left gluteal for OA multiple sites.

## 2020-02-07 NOTE — PROCEDURE
[FreeTextEntry1] : Procedure: b/L knee Supartz injections\par Date: 2/7/20\par Indications: therapeutic purposes \par #1 site identified in the left knee. \par # 2 site identified in the right knee \par \par The procedure risks was discussed with the patient.\par Indications: therapeutic purposes \par #1 site identified in the left Knee . Verbal consent was obtained. \par Anesthesia was with ethyl chloride.\par The patient was prepped with betadine solution. \par A 25 gauge 1.5 inch needle was used. 1% 1mL xylocaine was used for local anesthetic. \par Injectables: Supartz 25mg was injected to the site. \par Prior to injecting supartz, 3 cc of fluid aspirated from left knee.  Not sent for testing as it was drawn from  syringe that had xylocaine anesthetic in it.   \par The patient tolerated the procedure well..\par There were no complications. Handouts/patient instructions were given to patient . patient was instructed to call if redness at site, a decrease in range of motion or an increase in pain is noted after procedure. \par \par \par Identical procedure repeated for right knee, but 10 cc fluid were aspirated.  Sent for testing.  \par \par \par #3\par Procedure: IM ketorolac injection\par After skin prep with alcohol, 30mg ketorolac was injected to left gluteal.  There were no complications.\par Bandaid was placed on skin.

## 2020-02-10 LAB
B PERT IGG+IGM PNL SER: ABNORMAL
COLOR FLD: YELLOW
EOSINOPHIL # FLD MANUAL: 0 %
FLUID INTAKE SUBSTANCE CLASS: NORMAL
LYMPHOCYTES # FLD MANUAL: 13 %
MESOTHL CELL NFR FLD: 0 %
MONOS+MACROS NFR FLD MANUAL: 41 %
NEUTS SEG # FLD MANUAL: 46 %
NRBC # FLD: 0
RBC # FLD MANUAL: 232 /UL
SYCRY CLARITY: CLEAR
SYCRY COLOR: YELLOW
SYCRY ID: ABNORMAL
SYCRY TUBE: NORMAL
TOTAL CELLS COUNTED FLD: 370 /UL
TUBE TYPE: NORMAL
UNIDENT CELLS NFR FLD MANUAL: 0 %
VARIANT LYMPHS # FLD MANUAL: 0 %

## 2020-02-24 LAB — BACTERIA FLD CULT: NORMAL

## 2020-03-02 ENCOUNTER — APPOINTMENT (OUTPATIENT)
Dept: ELECTROPHYSIOLOGY | Facility: CLINIC | Age: 60
End: 2020-03-02
Payer: COMMERCIAL

## 2020-03-02 PROCEDURE — 93294 REM INTERROG EVL PM/LDLS PM: CPT

## 2020-03-02 PROCEDURE — 93296 REM INTERROG EVL PM/IDS: CPT

## 2020-03-03 ENCOUNTER — RX RENEWAL (OUTPATIENT)
Age: 60
End: 2020-03-03

## 2020-04-02 ENCOUNTER — APPOINTMENT (OUTPATIENT)
Dept: RHEUMATOLOGY | Facility: CLINIC | Age: 60
End: 2020-04-02

## 2020-04-23 ENCOUNTER — RX RENEWAL (OUTPATIENT)
Age: 60
End: 2020-04-23

## 2020-04-28 ENCOUNTER — APPOINTMENT (OUTPATIENT)
Dept: RHEUMATOLOGY | Facility: CLINIC | Age: 60
End: 2020-04-28

## 2020-06-08 ENCOUNTER — APPOINTMENT (OUTPATIENT)
Dept: ELECTROPHYSIOLOGY | Facility: CLINIC | Age: 60
End: 2020-06-08
Payer: COMMERCIAL

## 2020-06-08 PROCEDURE — 93294 REM INTERROG EVL PM/LDLS PM: CPT

## 2020-06-08 PROCEDURE — 93296 REM INTERROG EVL PM/IDS: CPT

## 2020-06-30 ENCOUNTER — RX RENEWAL (OUTPATIENT)
Age: 60
End: 2020-06-30

## 2020-07-30 ENCOUNTER — APPOINTMENT (OUTPATIENT)
Dept: RHEUMATOLOGY | Facility: CLINIC | Age: 60
End: 2020-07-30
Payer: COMMERCIAL

## 2020-07-30 VITALS
BODY MASS INDEX: 35.36 KG/M2 | SYSTOLIC BLOOD PRESSURE: 160 MMHG | WEIGHT: 220 LBS | DIASTOLIC BLOOD PRESSURE: 100 MMHG | OXYGEN SATURATION: 98 % | HEIGHT: 66 IN | HEART RATE: 68 BPM | TEMPERATURE: 96.9 F

## 2020-07-30 PROCEDURE — 99214 OFFICE O/P EST MOD 30 MIN: CPT

## 2020-07-30 RX ORDER — TIZANIDINE 2 MG/1
2 TABLET ORAL
Qty: 90 | Refills: 0 | Status: DISCONTINUED | COMMUNITY
Start: 2018-05-01 | End: 2020-07-30

## 2020-07-30 RX ORDER — MELOXICAM 15 MG/1
15 TABLET ORAL
Qty: 30 | Refills: 0 | Status: DISCONTINUED | COMMUNITY
Start: 2019-12-17 | End: 2020-07-30

## 2020-07-30 NOTE — HISTORY OF PRESENT ILLNESS
[FreeTextEntry1] : 61 y/o man with hx of PPM, HLD, GERD, presents for f/u of gout and OA. Patient states he wanted to see if he could try dietary changes first, and monitor his uric acid. I gave him colchicine to take prn last visit. \par Previously did not want to take any urate lowering medication. \par His most recent uric acid is 8.3 6/15/20.  Cr 1.36\par \par He had a flareup in his left 1st MTP and left 5th MTP.  Attributes it to barbecuing.  \par He took colchicine 2 tablets, then QOD, then 2x/week\par No longer on it.  \par Knees doing ok.  Would like gel shots again.  \par \par Only uses tizanidine as needed. Has not really required it too much lately. \par \par Was given BP med by his PCP, but hasn't started.

## 2020-07-30 NOTE — ASSESSMENT
[FreeTextEntry1] : 61 y/o man with hx of HLD, GERD, pacemaker, no hx of kidney stones, presents for evaluation and management of his gout. Currently his gout is stable, but he did have a recent flare.  He averages one flare per year.   He has osteoarthritis b/L knees and will benefit from HA injections. His pes anserine bursitis left knee is improved with injection.  His myalgia is also improved.  \par \par Plan:\par - colchicine for prn use for gout flare. He has only had one major attack.  refilled colchicine Rx.  \par -Counseled on foods to avoid. \par -Will order supartz again. Does not want any CS injection today. \par -XRs knees\par -can continue to hold off on uric acid lowering medication\par -Would avoid NSAIDs given elevated cr.  He plans on seeing nephrologist. \par -pes anserine bursitis Left s/p injection 6/28/18. Does not need another one. \par f/u September for HA injections.  \par routine f/u 6 months\par Labs reviewed

## 2020-09-08 ENCOUNTER — RX RENEWAL (OUTPATIENT)
Age: 60
End: 2020-09-08

## 2020-09-08 RX ORDER — DICLOFENAC SODIUM 10 MG/G
1 GEL TOPICAL
Qty: 300 | Refills: 1 | Status: ACTIVE | COMMUNITY
Start: 2018-10-23 | End: 1900-01-01

## 2020-09-10 ENCOUNTER — APPOINTMENT (OUTPATIENT)
Dept: RHEUMATOLOGY | Facility: CLINIC | Age: 60
End: 2020-09-10
Payer: COMMERCIAL

## 2020-09-10 VITALS
BODY MASS INDEX: 35.36 KG/M2 | DIASTOLIC BLOOD PRESSURE: 78 MMHG | HEART RATE: 75 BPM | TEMPERATURE: 97.7 F | HEIGHT: 66 IN | SYSTOLIC BLOOD PRESSURE: 144 MMHG | OXYGEN SATURATION: 98 % | WEIGHT: 220 LBS

## 2020-09-10 PROCEDURE — 20610 DRAIN/INJ JOINT/BURSA W/O US: CPT | Mod: 50

## 2020-09-10 RX ORDER — SODIUM HYALURONATE INTRA-ARTICULAR SOLN PREF SYR 25 MG/2.5ML 25/2.5 MG/ML
25 SOLUTION PREFILLED SYRINGE INTRAARTICULAR
Refills: 0 | Status: COMPLETED | OUTPATIENT
Start: 2020-09-10

## 2020-09-10 RX ADMIN — SODIUM HYALURONATE INTRA-ARTICULAR SOLN PREF SYR 25 MG/2.5ML 0 MG/2.5ML: 25/2.5 SOLUTION PREFILLED SYRINGE at 00:00

## 2020-09-10 NOTE — PROCEDURE
[FreeTextEntry1] : Procedure: b/L knee Supartz injections\par Date: 9/10/20\par Indications: therapeutic purposes \par #1 site identified in the left knee. \par # 2 site identified in the right knee \par \par The procedure risks was discussed with the patient.\par Indications: therapeutic purposes \par #1 site identified in the left Knee . Verbal consent was obtained. \par Anesthesia was with ethyl chloride.\par The patient was prepped with betadine solution. \par A 25 gauge 1.5 inch needle was used. 1% 1mL xylocaine was used for local anesthetic. \par Injectables: Supartz 25mg was injected to the site. \par The patient tolerated the procedure well..\par There were no complications. Handouts/patient instructions were given to patient . patient was instructed to call if redness at site, a decrease in range of motion or an increase in pain is noted after procedure. \par \par \par Identical procedure repeated for right knee.\par \par \par

## 2020-09-15 ENCOUNTER — APPOINTMENT (OUTPATIENT)
Dept: ELECTROPHYSIOLOGY | Facility: CLINIC | Age: 60
End: 2020-09-15
Payer: COMMERCIAL

## 2020-09-15 PROCEDURE — 93296 REM INTERROG EVL PM/IDS: CPT

## 2020-09-15 PROCEDURE — 93294 REM INTERROG EVL PM/LDLS PM: CPT

## 2020-09-17 ENCOUNTER — APPOINTMENT (OUTPATIENT)
Dept: RHEUMATOLOGY | Facility: CLINIC | Age: 60
End: 2020-09-17

## 2020-09-24 ENCOUNTER — APPOINTMENT (OUTPATIENT)
Dept: RHEUMATOLOGY | Facility: CLINIC | Age: 60
End: 2020-09-24
Payer: COMMERCIAL

## 2020-09-24 VITALS
HEIGHT: 66 IN | HEART RATE: 81 BPM | BODY MASS INDEX: 35.2 KG/M2 | SYSTOLIC BLOOD PRESSURE: 140 MMHG | OXYGEN SATURATION: 96 % | WEIGHT: 219 LBS | DIASTOLIC BLOOD PRESSURE: 72 MMHG | TEMPERATURE: 97.5 F

## 2020-09-24 PROCEDURE — 20610 DRAIN/INJ JOINT/BURSA W/O US: CPT | Mod: 50

## 2020-09-24 NOTE — PROCEDURE
[FreeTextEntry1] : Procedure: b/L knee Supartz injections\par Date: 9/24/20\par Indications: therapeutic purposes \par #1 site identified in the left knee. \par # 2 site identified in the right knee \par \par The procedure risks was discussed with the patient.\par Indications: therapeutic purposes \par #1 site identified in the left Knee . Verbal consent was obtained. \par Anesthesia was with ethyl chloride.\par The patient was prepped with betadine solution. \par A 25 gauge 1.5 inch needle was used. 1% 1mL xylocaine was used for local anesthetic. \par Injectables: Supartz 25mg was injected to the site. \par The patient tolerated the procedure well..\par There were no complications. Handouts/patient instructions were given to patient . patient was instructed to call if redness at site, a decrease in range of motion or an increase in pain is noted after procedure. \par \par \par #2 Identical procedure repeated for right knee, except 6 cc of straw colored fluid aspirated prior to injecting supartz.  \par Fluid sent for analysis\par \par

## 2020-09-25 LAB
B PERT IGG+IGM PNL SER: CLEAR
COLOR FLD: YELLOW
EOSINOPHIL # FLD MANUAL: 0 %
FLUID INTAKE SUBSTANCE CLASS: NORMAL
LYMPHOCYTES # FLD MANUAL: 45 %
MESOTHL CELL NFR FLD: 10 %
MONOS+MACROS NFR FLD MANUAL: 39 %
NEUTS SEG # FLD MANUAL: 6 %
NRBC # FLD: 0
RBC # FLD MANUAL: 4 /UL
SYCRY CLARITY: CLEAR
SYCRY COLOR: YELLOW
SYCRY ID: ABNORMAL
SYCRY TUBE: NORMAL
TOTAL CELLS COUNTED FLD: 188 /UL
TUBE TYPE: NORMAL
UNIDENT CELLS NFR FLD MANUAL: 0 %
VARIANT LYMPHS # FLD MANUAL: 0 %

## 2020-10-01 ENCOUNTER — APPOINTMENT (OUTPATIENT)
Dept: RHEUMATOLOGY | Facility: CLINIC | Age: 60
End: 2020-10-01
Payer: COMMERCIAL

## 2020-10-01 VITALS
TEMPERATURE: 97.7 F | WEIGHT: 220 LBS | DIASTOLIC BLOOD PRESSURE: 72 MMHG | HEART RATE: 78 BPM | OXYGEN SATURATION: 97 % | SYSTOLIC BLOOD PRESSURE: 122 MMHG | BODY MASS INDEX: 35.51 KG/M2

## 2020-10-01 PROCEDURE — 20610 DRAIN/INJ JOINT/BURSA W/O US: CPT | Mod: 50

## 2020-10-01 NOTE — ASSESSMENT
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  Though he is minimally symptomatic.  \par Advised colchicine 0.6mg daily x 1 week.  Still does not want allopurinol.  \par Consider pes bursa injections if still bothersome after colchicine.

## 2020-10-01 NOTE — PROCEDURE
[FreeTextEntry1] : Procedure: b/L knee Supartz injections\par Date: 10/1/20\par Indications: therapeutic purposes \par #1 site identified in the left knee. \par # 2 site identified in the right knee \par \par The procedure risks was discussed with the patient.\par Indications: therapeutic purposes \par #1 site identified in the left Knee . Verbal consent was obtained. \par Anesthesia was with ethyl chloride.\par The patient was prepped with betadine solution. \par A 25 gauge 1.5 inch needle was used. 1% 1mL xylocaine was used for local anesthetic. \par Aspirate 2 cc of yellow fluid aspirated\par Injectables: Supartz 25mg was injected to the site. \par The patient tolerated the procedure well..\par There were no complications. Handouts/patient instructions were given to patient . patient was instructed to call if redness at site, a decrease in range of motion or an increase in pain is noted after procedure. \par \par \par #2 Identical procedure repeated for right knee, except 5 cc of straw colored fluid aspirated prior to injecting supartz. \par Fluid sent for analysis\par \par

## 2020-10-22 ENCOUNTER — APPOINTMENT (OUTPATIENT)
Dept: RHEUMATOLOGY | Facility: CLINIC | Age: 60
End: 2020-10-22
Payer: COMMERCIAL

## 2020-10-22 VITALS
OXYGEN SATURATION: 97 % | DIASTOLIC BLOOD PRESSURE: 76 MMHG | TEMPERATURE: 97.1 F | WEIGHT: 220 LBS | BODY MASS INDEX: 35.51 KG/M2 | SYSTOLIC BLOOD PRESSURE: 132 MMHG | HEART RATE: 76 BPM

## 2020-10-22 PROCEDURE — 20610 DRAIN/INJ JOINT/BURSA W/O US: CPT | Mod: 50

## 2020-10-22 PROCEDURE — 99072 ADDL SUPL MATRL&STAF TM PHE: CPT

## 2020-10-22 RX ADMIN — METHYLPREDNISOLONE ACETATE 0 MG/ML: 80 INJECTION, SUSPENSION INTRA-ARTICULAR; INTRALESIONAL; INTRAMUSCULAR; SOFT TISSUE at 00:00

## 2020-10-22 NOTE — PROCEDURE
[FreeTextEntry1] : Procedure: \par Date: \par The procedure risks was discussed with the patient.\par Indications: therapeutic purposes \par #1 site identified in the Left PAB . Verbal consent was obtained. \par Anesthesia was with ethyl chloride.\par The patient was prepped with betadine solution. \par A 25 gauge 1.5 inch needle was used.\par Injectables: Depomedrol 40 mg was injected into the site The Depomedrol was mixed with 1mL of xylocaine 1%. \par The patient tolerated the procedure well..\par There were no complications. Handouts/patient instructions were given to patient . patient was instructed to call if redness at site, a decrease in range of motion or an increase in pain is noted after procedure.\par #2  Identical procedure performed for RPAB.

## 2020-11-13 ENCOUNTER — RX RENEWAL (OUTPATIENT)
Age: 60
End: 2020-11-13

## 2020-12-01 ENCOUNTER — RX RENEWAL (OUTPATIENT)
Age: 60
End: 2020-12-01

## 2020-12-15 ENCOUNTER — APPOINTMENT (OUTPATIENT)
Dept: ELECTROPHYSIOLOGY | Facility: CLINIC | Age: 60
End: 2020-12-15

## 2021-01-26 ENCOUNTER — APPOINTMENT (OUTPATIENT)
Dept: RHEUMATOLOGY | Facility: CLINIC | Age: 61
End: 2021-01-26
Payer: COMMERCIAL

## 2021-01-26 VITALS
HEART RATE: 80 BPM | SYSTOLIC BLOOD PRESSURE: 120 MMHG | WEIGHT: 220 LBS | DIASTOLIC BLOOD PRESSURE: 60 MMHG | BODY MASS INDEX: 35.36 KG/M2 | HEIGHT: 66 IN | TEMPERATURE: 96.8 F | OXYGEN SATURATION: 95 %

## 2021-01-26 DIAGNOSIS — M70.50 OTHER BURSITIS OF KNEE, UNSPECIFIED KNEE: ICD-10-CM

## 2021-01-26 PROCEDURE — 99214 OFFICE O/P EST MOD 30 MIN: CPT | Mod: 25

## 2021-01-26 PROCEDURE — 20610 DRAIN/INJ JOINT/BURSA W/O US: CPT | Mod: 50

## 2021-01-26 PROCEDURE — 99072 ADDL SUPL MATRL&STAF TM PHE: CPT

## 2021-01-26 RX ORDER — TAMSULOSIN HYDROCHLORIDE 0.4 MG/1
0.4 CAPSULE ORAL
Qty: 90 | Refills: 0 | Status: ACTIVE | COMMUNITY
Start: 2020-12-02

## 2021-01-26 RX ORDER — METHYLPRED ACET/NACL,ISO-OS/PF 80 MG/ML
80 VIAL (ML) INJECTION
Qty: 2 | Refills: 0 | Status: COMPLETED | OUTPATIENT
Start: 2021-01-26

## 2021-01-26 RX ADMIN — METHYLPREDNISOLONE ACETATE 0 MG/ML: 80 INJECTION, SUSPENSION INTRA-ARTICULAR; INTRALESIONAL; INTRAMUSCULAR; SOFT TISSUE at 00:00

## 2021-01-26 NOTE — HISTORY OF PRESENT ILLNESS
[FreeTextEntry1] : 61 y/o man with hx of PPM, HLD, GERD, presents for f/u of gout and OA. Patient states he wanted to see if he could try dietary changes first, and monitor his uric acid. I gave him colchicine to take prn last visit. \par Previously did not want to take any urate lowering medication. \par His most recent uric acid is 8.3 6/15/20. Cr 1.36\par \par He declined rate lowering therapy previously.  \par He had a flareup in his left 1st MTP and left 5th MTP. Attributes it to barbecuing. \par He took colchicine 2 tablets, then QOD, then 2x/week\par No longer on it. \par Knees doing ok. Would like gel shots again for April 2021.  \par \par He had relief with PAB injections last visit, and would like to have them again.  \par \par Only uses tizanidine as needed. Has not really required it too much lately. \par \par Was given BP med by his PCP, but hasn't started. \par

## 2021-01-26 NOTE — PROCEDURE
[FreeTextEntry1] : Procedure: b/L PAB injection\par Date: 1/26/21\par The procedure risks was discussed with the patient.\par Indications: therapeutic purposes \par #1 site identified in the Left PAB . Verbal consent was obtained. \par Anesthesia was with ethyl chloride.\par The patient was prepped with betadine solution. \par A 25 gauge 1.5 inch needle was used.\par Injectables: Depomedrol 80 mg was injected into the site The Depomedrol was mixed with 1mL of xylocaine 1%. \par The patient tolerated the procedure well..\par There were no complications. Handouts/patient instructions were given to patient . patient was instructed to call if redness at site, a decrease in range of motion or an increase in pain is noted after procedure.\par #2 Identical procedure performed for RPAB. \par

## 2021-01-26 NOTE — ASSESSMENT
[FreeTextEntry1] : 59 y/o man with hx of HLD, GERD, pacemaker, no hx of kidney stones, presents for evaluation and management of his gout. Currently his gout is stable. He averages one flare per year.   He has osteoarthritis b/L knees and will benefit from HA injections. His Pes anserine bursitis is active again.  He is interested in CS injections for these.  \par \par Plan:\par - colchicine for prn use for gout flare. He has only had one major attack. refilled colchicine Rx. 9/24/20 synovial fluid showed intra and extracellular MSU crystals.  Has declined urate lowering therapy.  \par -Counseled on foods to avoid. \par -Will order supartz again. \par -XRs knees show b/L OA changes\par -Would avoid NSAIDs given elevated cr. He plans on seeing nephrologist. \par -pes anserine bursitis injection today 1/26/21 with 80mg depomedrol \par f/u after 4/1/2021 for Supartz injections again.  \par \par

## 2021-02-11 ENCOUNTER — RX RENEWAL (OUTPATIENT)
Age: 61
End: 2021-02-11

## 2021-03-10 ENCOUNTER — APPOINTMENT (OUTPATIENT)
Dept: ELECTROPHYSIOLOGY | Facility: CLINIC | Age: 61
End: 2021-03-10
Payer: COMMERCIAL

## 2021-03-10 ENCOUNTER — NON-APPOINTMENT (OUTPATIENT)
Age: 61
End: 2021-03-10

## 2021-03-10 PROCEDURE — 93294 REM INTERROG EVL PM/LDLS PM: CPT

## 2021-03-10 PROCEDURE — 93296 REM INTERROG EVL PM/IDS: CPT

## 2021-04-08 ENCOUNTER — APPOINTMENT (OUTPATIENT)
Dept: RHEUMATOLOGY | Facility: CLINIC | Age: 61
End: 2021-04-08

## 2021-04-15 ENCOUNTER — NON-APPOINTMENT (OUTPATIENT)
Age: 61
End: 2021-04-15

## 2021-04-15 ENCOUNTER — APPOINTMENT (OUTPATIENT)
Dept: RHEUMATOLOGY | Facility: CLINIC | Age: 61
End: 2021-04-15
Payer: COMMERCIAL

## 2021-04-15 VITALS
SYSTOLIC BLOOD PRESSURE: 154 MMHG | BODY MASS INDEX: 35.36 KG/M2 | OXYGEN SATURATION: 95 % | TEMPERATURE: 97.5 F | DIASTOLIC BLOOD PRESSURE: 88 MMHG | HEIGHT: 66 IN | HEART RATE: 69 BPM | WEIGHT: 220 LBS

## 2021-04-15 PROCEDURE — 99072 ADDL SUPL MATRL&STAF TM PHE: CPT

## 2021-04-15 PROCEDURE — 20610 DRAIN/INJ JOINT/BURSA W/O US: CPT | Mod: 50

## 2021-04-15 RX ORDER — METHYLPRED ACET/NACL,ISO-OS/PF 80 MG/ML
80 VIAL (ML) INJECTION
Qty: 2 | Refills: 0 | Status: COMPLETED | OUTPATIENT
Start: 2021-04-15

## 2021-04-15 RX ADMIN — METHYLPREDNISOLONE ACETATE 0 MG/ML: 80 INJECTION, SUSPENSION INTRA-ARTICULAR; INTRALESIONAL; INTRAMUSCULAR; SOFT TISSUE at 00:00

## 2021-04-15 NOTE — PROCEDURE
[FreeTextEntry1] : Procedure: b/L knee injections with Depomedrol\par Date: 4/15/21\par The procedure risks was discussed with the patient.\par Indications: therapeutic purposes \par #1 site identified in the Left knee . Verbal consent was obtained. \par Anesthesia was with ethyl chloride.\par The patient was prepped with betadine solution. \par A 25 gauge 1.5 inch needle was used.\par Injectables: Depomedrol 80 mg was injected into the site The Depomedrol was mixed with 1mL of xylocaine 1%. \par The patient tolerated the procedure well..\par There were no complications. Handouts/patient instructions were given to patient . patient was instructed to call if redness at site, a decrease in range of motion or an increase in pain is noted after procedure.\par #2 Identical procedure performed for R Knee\par

## 2021-04-22 ENCOUNTER — APPOINTMENT (OUTPATIENT)
Dept: RHEUMATOLOGY | Facility: CLINIC | Age: 61
End: 2021-04-22

## 2021-04-29 ENCOUNTER — APPOINTMENT (OUTPATIENT)
Dept: RHEUMATOLOGY | Facility: CLINIC | Age: 61
End: 2021-04-29

## 2021-05-13 ENCOUNTER — APPOINTMENT (OUTPATIENT)
Dept: RHEUMATOLOGY | Facility: CLINIC | Age: 61
End: 2021-05-13
Payer: COMMERCIAL

## 2021-05-13 VITALS
TEMPERATURE: 97.9 F | HEIGHT: 66 IN | SYSTOLIC BLOOD PRESSURE: 155 MMHG | HEART RATE: 73 BPM | BODY MASS INDEX: 35.36 KG/M2 | OXYGEN SATURATION: 96 % | WEIGHT: 220 LBS | DIASTOLIC BLOOD PRESSURE: 79 MMHG

## 2021-05-13 PROCEDURE — 99072 ADDL SUPL MATRL&STAF TM PHE: CPT

## 2021-05-13 PROCEDURE — 20610 DRAIN/INJ JOINT/BURSA W/O US: CPT | Mod: 50

## 2021-05-13 RX ORDER — SODIUM HYALURONATE INTRA-ARTICULAR SOLN PREF SYR 25 MG/2.5ML 25/2.5 MG/ML
25 SOLUTION PREFILLED SYRINGE INTRAARTICULAR
Qty: 6 | Refills: 0 | Status: DISCONTINUED | COMMUNITY
Start: 2018-08-16 | End: 2021-05-13

## 2021-05-13 NOTE — PROCEDURE
[FreeTextEntry1] : Date: 5/13/21\par Procedure: b/L Euflexxa injections\par \par The procedure risks were discussed with the patient.\par Indications: Therapeutic purposes\par #1 site identified in the Right knee.  Verbal consent was obtained.\par The patient was prepped with butadiene solution.\par Topical Anesthesia was with ethyl chloride.\par A 21 Gauge 1.5 inch needle was used. 1mL of 1% xylocaine was used for local anesthetic.  \par Injectables: Euflexxa syringe\par The patient tolerated the procedure well\par \par . #2 Identical procedure repeated in left knee.  \par There were no complications.  Handouts/patient instructions were given to patient.  Patient was instructed to call if redness at site, a decrease in range of motion, or significantly increased amount of pain is noted after the procedure.\par

## 2021-05-27 ENCOUNTER — APPOINTMENT (OUTPATIENT)
Dept: RHEUMATOLOGY | Facility: CLINIC | Age: 61
End: 2021-05-27
Payer: COMMERCIAL

## 2021-05-27 VITALS
TEMPERATURE: 97.9 F | WEIGHT: 220 LBS | SYSTOLIC BLOOD PRESSURE: 167 MMHG | DIASTOLIC BLOOD PRESSURE: 95 MMHG | OXYGEN SATURATION: 96 % | BODY MASS INDEX: 35.51 KG/M2 | HEART RATE: 86 BPM

## 2021-05-27 PROCEDURE — 20610 DRAIN/INJ JOINT/BURSA W/O US: CPT | Mod: 50

## 2021-05-27 PROCEDURE — 99072 ADDL SUPL MATRL&STAF TM PHE: CPT

## 2021-05-27 NOTE — PROCEDURE
[FreeTextEntry1] : Date: 5/27/21\par Procedure: b/L Euflexxa injections\par \par The procedure risks were discussed with the patient.\par Indications: Therapeutic purposes\par #1 site identified in the Right knee. Verbal consent was obtained.\par The patient was prepped with butadiene solution.\par Topical Anesthesia was with ethyl chloride.\par A 21 Gauge 1.5 inch needle was used. 1mL of 1% xylocaine was used for local anesthetic. \par Injectables: Euflexxa syringe\par The patient tolerated the procedure well\par \par . #2 Identical procedure repeated in left knee. \par There were no complications. Handouts/patient instructions were given to patient. Patient was instructed to call if redness at site, a decrease in range of motion, or significantly increased amount of pain is noted after the procedure.\par

## 2021-06-03 ENCOUNTER — APPOINTMENT (OUTPATIENT)
Dept: RHEUMATOLOGY | Facility: CLINIC | Age: 61
End: 2021-06-03
Payer: COMMERCIAL

## 2021-06-03 VITALS
SYSTOLIC BLOOD PRESSURE: 163 MMHG | HEART RATE: 87 BPM | BODY MASS INDEX: 35.51 KG/M2 | DIASTOLIC BLOOD PRESSURE: 87 MMHG | WEIGHT: 220 LBS | OXYGEN SATURATION: 97 % | TEMPERATURE: 97.7 F

## 2021-06-03 PROCEDURE — 99072 ADDL SUPL MATRL&STAF TM PHE: CPT

## 2021-06-03 PROCEDURE — 20610 DRAIN/INJ JOINT/BURSA W/O US: CPT | Mod: 50

## 2021-06-03 NOTE — PROCEDURE
[FreeTextEntry1] : Date: 6/3/21\par Procedure: b/L Euflexxa injections\par \par The procedure risks were discussed with the patient.\par Indications: Therapeutic purposes\par #1 site identified in the Right knee. Verbal consent was obtained.\par The patient was prepped with butadiene solution.\par Topical Anesthesia was with ethyl chloride.\par A 21 Gauge 1.5 inch needle was used. 1mL of 1% xylocaine was used for local anesthetic. \par Injectables: Euflexxa syringe\par The patient tolerated the procedure well\par \par . #2 Identical procedure repeated in left knee. \par There were no complications. Handouts/patient instructions were given to patient. Patient was instructed to call if redness at site, a decrease in range of motion, or significantly increased amount of pain is noted after the procedure.\par

## 2021-06-10 ENCOUNTER — NON-APPOINTMENT (OUTPATIENT)
Age: 61
End: 2021-06-10

## 2021-06-10 ENCOUNTER — APPOINTMENT (OUTPATIENT)
Dept: ELECTROPHYSIOLOGY | Facility: CLINIC | Age: 61
End: 2021-06-10
Payer: COMMERCIAL

## 2021-06-10 PROCEDURE — 93296 REM INTERROG EVL PM/IDS: CPT

## 2021-06-10 PROCEDURE — 93294 REM INTERROG EVL PM/LDLS PM: CPT

## 2021-08-10 ENCOUNTER — APPOINTMENT (OUTPATIENT)
Dept: RHEUMATOLOGY | Facility: CLINIC | Age: 61
End: 2021-08-10
Payer: COMMERCIAL

## 2021-08-10 VITALS
SYSTOLIC BLOOD PRESSURE: 160 MMHG | WEIGHT: 220 LBS | BODY MASS INDEX: 35.36 KG/M2 | OXYGEN SATURATION: 96 % | DIASTOLIC BLOOD PRESSURE: 81 MMHG | HEIGHT: 66 IN | HEART RATE: 82 BPM | TEMPERATURE: 96.9 F

## 2021-08-10 PROCEDURE — 99214 OFFICE O/P EST MOD 30 MIN: CPT

## 2021-08-10 NOTE — ASSESSMENT
[FreeTextEntry1] : OA, gout\par \par \par Gout- recent twinge/mini flare\par -To take colchicine 1.2mg at onset of flare, the 0.6mg one hour later\par -Can apply voltaren gel to instep of foot as well\par -We talked about allopurinol to lower uric acid,  but he would like to hold off on this for now as well\par -Counseled on diet\par \par OA- was going to have CS injections today, but decided against it as he didn't feel it was bad enough\par -Will postpone CS injections to when he is more in need\par -repeat Supartz series as needed\par \par hx of elevated creatinine-\par -has hx of elevated creatinine.  Latest is in normal range\par -Advised to keep his weight down, BP under control, stay well hydrated, and and to f/u with PCP for kidney monitoring\par \par f/u 3 months

## 2021-08-10 NOTE — HISTORY OF PRESENT ILLNESS
[FreeTextEntry1] : Patient decided he wants to hold off on CS injection to the knees since they are not that bad yet.  \par He has questions about his uric acid, and kidney function on his BW recently done with his PCP.\par The uric acid returned at 9.\par He reports he had some twinge in his right instep.  It went away, so he didn't pay much attention to it.  He wants to know if he in the future he can take both colchicine and diclofenac gel at the same time.  \par \par He is asking questions about his creatinine, compared to in the past.

## 2021-09-10 ENCOUNTER — APPOINTMENT (OUTPATIENT)
Dept: ELECTROPHYSIOLOGY | Facility: CLINIC | Age: 61
End: 2021-09-10
Payer: COMMERCIAL

## 2021-09-10 ENCOUNTER — NON-APPOINTMENT (OUTPATIENT)
Age: 61
End: 2021-09-10

## 2021-09-10 PROCEDURE — 93294 REM INTERROG EVL PM/LDLS PM: CPT

## 2021-09-10 PROCEDURE — 93296 REM INTERROG EVL PM/IDS: CPT

## 2021-11-11 ENCOUNTER — APPOINTMENT (OUTPATIENT)
Dept: RHEUMATOLOGY | Facility: CLINIC | Age: 61
End: 2021-11-11

## 2021-12-10 ENCOUNTER — APPOINTMENT (OUTPATIENT)
Dept: ELECTROPHYSIOLOGY | Facility: CLINIC | Age: 61
End: 2021-12-10

## 2022-01-25 ENCOUNTER — APPOINTMENT (OUTPATIENT)
Dept: RHEUMATOLOGY | Facility: CLINIC | Age: 62
End: 2022-01-25
Payer: COMMERCIAL

## 2022-01-25 VITALS
OXYGEN SATURATION: 98 % | TEMPERATURE: 97.6 F | DIASTOLIC BLOOD PRESSURE: 88 MMHG | WEIGHT: 220 LBS | HEART RATE: 78 BPM | SYSTOLIC BLOOD PRESSURE: 150 MMHG | BODY MASS INDEX: 35.36 KG/M2 | HEIGHT: 66 IN

## 2022-01-25 PROCEDURE — 20610 DRAIN/INJ JOINT/BURSA W/O US: CPT | Mod: 50

## 2022-01-25 NOTE — PROCEDURE
[FreeTextEntry1] : Date: 1/25/22\par Procedure: b/L Euflexxa injections\par \par The procedure risks were discussed with the patient.\par Indications: Therapeutic purposes\par #1 site identified in the Right knee. Verbal consent was obtained.\par The patient was prepped with butadiene solution.\par Topical Anesthesia was with ethyl chloride.\par A 21 Gauge 1.5 inch needle was used. 1mL of 1% xylocaine was used for local anesthetic. \par Injectables: Euflexxa syringe\par The patient tolerated the procedure well\par \par . #2 Identical procedure repeated in left knee. \par There were no complications. Handouts/patient instructions were given to patient. Patient was instructed to call if redness at site, a decrease in range of motion, or significantly increased amount of pain is noted after the procedure.\par \par

## 2022-01-25 NOTE — ASSESSMENT
[FreeTextEntry1] : f/u 1 week for 2nd of 3 b/L euflexxa injections\par LOT [id number]\par exp 3/12/2023

## 2022-02-01 ENCOUNTER — APPOINTMENT (OUTPATIENT)
Dept: RHEUMATOLOGY | Facility: CLINIC | Age: 62
End: 2022-02-01
Payer: COMMERCIAL

## 2022-02-01 VITALS
TEMPERATURE: 97.6 F | BODY MASS INDEX: 35.36 KG/M2 | DIASTOLIC BLOOD PRESSURE: 84 MMHG | HEIGHT: 66 IN | HEART RATE: 80 BPM | WEIGHT: 220 LBS | SYSTOLIC BLOOD PRESSURE: 165 MMHG | OXYGEN SATURATION: 98 %

## 2022-02-01 PROCEDURE — 20610 DRAIN/INJ JOINT/BURSA W/O US: CPT | Mod: 50

## 2022-02-03 NOTE — PROCEDURE
[FreeTextEntry1] : Date: 2/1/22\par Procedure: b/L Euflexxa injections\par \par The procedure risks were discussed with the patient.\par Indications: Therapeutic purposes\par #1 site identified in the Right knee. Verbal consent was obtained.\par The patient was prepped with butadiene solution.\par Topical Anesthesia was with ethyl chloride.\par A 21 Gauge 1.5 inch needle was used. 1mL of 1% xylocaine was used for local anesthetic. \par Injectables: Euflexxa syringe\par The patient tolerated the procedure well\par \par . #2 Identical procedure repeated in left knee. \par There were no complications. Handouts/patient instructions were given to patient. Patient was instructed to call if redness at site, a decrease in range of motion, or significantly increased amount of pain is noted after the procedure.\par 
no

## 2022-02-08 ENCOUNTER — APPOINTMENT (OUTPATIENT)
Dept: RHEUMATOLOGY | Facility: CLINIC | Age: 62
End: 2022-02-08
Payer: COMMERCIAL

## 2022-02-08 VITALS
HEART RATE: 77 BPM | BODY MASS INDEX: 35.51 KG/M2 | DIASTOLIC BLOOD PRESSURE: 82 MMHG | TEMPERATURE: 98 F | WEIGHT: 220 LBS | SYSTOLIC BLOOD PRESSURE: 164 MMHG | OXYGEN SATURATION: 97 %

## 2022-02-08 PROCEDURE — 20610 DRAIN/INJ JOINT/BURSA W/O US: CPT | Mod: 50

## 2022-02-08 NOTE — PROCEDURE
[FreeTextEntry1] : Date: 2/8/22\par Procedure: b/L Euflexxa injections\par \par The procedure risks were discussed with the patient.\par Indications: Therapeutic purposes\par #1 site identified in the Right knee. Verbal consent was obtained.\par The patient was prepped with butadiene solution.\par Topical Anesthesia was with ethyl chloride.\par A 21 Gauge 1.5 inch needle was used. 1mL of 1% xylocaine was used for local anesthetic. \par Injectables: Euflexxa syringe\par The patient tolerated the procedure well\par \par . #2 Identical procedure repeated in left knee. \par There were no complications. Handouts/patient instructions were given to patient. Patient was instructed to call if redness at site, a decrease in range of motion, or significantly increased amount of pain is noted after the procedure.\par \par

## 2022-02-08 NOTE — ASSESSMENT
[FreeTextEntry1] : completed 3/3 euflexxa injections\par f/u XR\par f/u synovial fluid\par telephonic in 2-3 weeks

## 2022-02-09 LAB
B PERT IGG+IGM PNL SER: CLEAR
COLOR FLD: YELLOW
EOSINOPHIL # FLD MANUAL: 0 %
FLUID INTAKE SUBSTANCE CLASS: NORMAL
LYMPHOCYTES # FLD MANUAL: 13 %
MESOTHL CELL NFR FLD: 1 %
MONOS+MACROS NFR FLD MANUAL: 75 %
NEUTS SEG # FLD MANUAL: 11 %
NRBC # FLD: 0
RBC # FLD MANUAL: 211 /UL
SYCRY CLARITY: CLEAR
SYCRY COLOR: YELLOW
SYCRY ID: ABNORMAL
SYCRY TUBE: NORMAL
TOTAL CELLS COUNTED FLD: 285 /UL
TUBE TYPE: NORMAL
UNIDENT CELLS NFR FLD MANUAL: 0 %
VARIANT LYMPHS # FLD MANUAL: 0 %

## 2022-02-28 LAB — BACTERIA FLD CULT: NORMAL

## 2022-03-01 ENCOUNTER — APPOINTMENT (OUTPATIENT)
Dept: RHEUMATOLOGY | Facility: CLINIC | Age: 62
End: 2022-03-01
Payer: COMMERCIAL

## 2022-03-01 PROCEDURE — 99441: CPT

## 2022-08-11 ENCOUNTER — APPOINTMENT (OUTPATIENT)
Dept: RHEUMATOLOGY | Facility: CLINIC | Age: 62
End: 2022-08-11

## 2022-08-11 VITALS
SYSTOLIC BLOOD PRESSURE: 163 MMHG | TEMPERATURE: 97.9 F | OXYGEN SATURATION: 97 % | HEART RATE: 77 BPM | WEIGHT: 215 LBS | DIASTOLIC BLOOD PRESSURE: 81 MMHG | BODY MASS INDEX: 34.7 KG/M2

## 2022-08-11 PROCEDURE — 99214 OFFICE O/P EST MOD 30 MIN: CPT | Mod: 25

## 2022-08-11 PROCEDURE — 96372 THER/PROPH/DIAG INJ SC/IM: CPT

## 2022-08-11 RX ORDER — KETOROLAC TROMETHAMINE 30 MG/ML
30 INJECTION, SOLUTION INTRAMUSCULAR; INTRAVENOUS
Qty: 2 | Refills: 0 | Status: COMPLETED | OUTPATIENT
Start: 2022-08-11

## 2022-08-11 RX ORDER — METOPROLOL SUCCINATE 25 MG/1
25 TABLET, EXTENDED RELEASE ORAL
Qty: 90 | Refills: 0 | Status: ACTIVE | COMMUNITY
Start: 2022-05-10

## 2022-08-11 RX ORDER — FINASTERIDE 5 MG/1
5 TABLET, FILM COATED ORAL
Qty: 90 | Refills: 0 | Status: DISCONTINUED | COMMUNITY
Start: 2022-04-11

## 2022-08-11 RX ADMIN — KETOROLAC TROMETHAMINE 0 MG/ML: 30 INJECTION, SOLUTION INTRAMUSCULAR; INTRAVENOUS at 00:00

## 2022-08-11 NOTE — HISTORY OF PRESENT ILLNESS
[FreeTextEntry1] : Gout, OA knees\par \par States he started drinking tart cherry juice.  \par Feels it is helping his uric acid. \par He hasn't had a gout attack since 2/2022.\par \par Current pain is 2/10 in intensity with 10 minutes of AM stiffness.  \par \par He is wondering if he can get another ketorolac IM injection as that injeciton previously helped his arhtritis pain.  \par \par He is also interested in planning for the next round of HA injections for his knees.  \par

## 2022-08-11 NOTE — PROCEDURE
[FreeTextEntry1] : Procedure: IM ketorolac injection\par After skin prep with alcohol, 30mg ketorolac was injected to  left gluteal .  There were no complications.\par

## 2022-08-11 NOTE — ASSESSMENT
[FreeTextEntry1] : OA, gout\par \par \par Gout- quiescent\par -To take colchicine 1.2mg at onset of flare, the 0.6mg one hour later\par -Can apply voltaren gel to instep of foot as well\par -We talked about allopurinol to lower uric acid, but he would like to hold off on this for now as well\par -Counseled on diet\par -check Uric acid\par \par OA- was going to have CS injections today, but decided against it as he didn't feel it was bad enough\par -Will postpone CS injections to when he is more in need\par -repeat Euflexxa injections\par -Ketorolac 30mg Im injection today 08/11/22\par \par hx of elevated creatinine-\par -has hx of elevated creatinine. Latest is in normal range\par -Advised to keep his weight down, BP under control, stay well hydrated, and and to f/u with PCP for kidney monitoring\par \par f/u next month for euflexxa injections\par \par  \par

## 2022-09-15 ENCOUNTER — APPOINTMENT (OUTPATIENT)
Dept: RHEUMATOLOGY | Facility: CLINIC | Age: 62
End: 2022-09-15

## 2022-09-15 VITALS
SYSTOLIC BLOOD PRESSURE: 130 MMHG | DIASTOLIC BLOOD PRESSURE: 80 MMHG | HEIGHT: 66 IN | BODY MASS INDEX: 35.2 KG/M2 | HEART RATE: 70 BPM | TEMPERATURE: 97 F | OXYGEN SATURATION: 97 % | WEIGHT: 219 LBS

## 2022-09-15 PROCEDURE — 99213 OFFICE O/P EST LOW 20 MIN: CPT | Mod: 25

## 2022-09-15 PROCEDURE — 20610 DRAIN/INJ JOINT/BURSA W/O US: CPT | Mod: 50

## 2022-09-15 RX ORDER — HYALURONATE SODIUM 20 MG/2 ML
20 SYRINGE (ML) INTRAARTICULAR
Refills: 0 | Status: COMPLETED | OUTPATIENT
Start: 2022-09-15

## 2022-09-15 RX ADMIN — Medication 0 MG/2ML: at 00:00

## 2022-09-15 NOTE — PROCEDURE
[FreeTextEntry1] : Date: 9/15/22\par Procedure: b/L Euflexxa injections\par \par The procedure risks were discussed with the patient.\par Indications: Therapeutic purposes\par #1 site identified in the Right knee. Verbal consent was obtained.\par The patient was prepped with butadiene solution.\par Topical Anesthesia was with ethyl chloride.\par A 21 Gauge 1.5 inch needle was used. 1mL of 1% xylocaine was used for local anesthetic. \par Injectables: Euflexxa syringe\par The patient tolerated the procedure well\par \par . #2 Identical procedure repeated in left knee. \par There were no complications. Handouts/patient instructions were given to patient. Patient was instructed to call if redness at site, a decrease in range of motion, or significantly increased amount of pain is noted after the procedure.\par \par

## 2022-09-15 NOTE — ASSESSMENT
[FreeTextEntry1] : \par OA, gout\par \par \par Gout- quiescent\par -To take colchicine 1.2mg at onset of flare, the 0.6mg one hour later\par -Can apply voltaren gel to instep of foot as well\par -We talked about allopurinol to lower uric acid, and he is now more open to this.  Will give me a decision next week.  \par -Counseled on diet\par -uric acid 8.8\par \par OA- was going to have CS injections today, but decided against it as he didn't feel it was bad enough\par -Will postpone CS injections to when he is more in need\par -repeat Euflexxa injections,  first injections of series given today.  \par -Ketorolac 30mg Im injection 08/11/22\par \par hx of elevated creatinine-\par -has hx of elevated creatinine. Latest is in normal range\par -Advised to keep his weight down, BP under control, stay well hydrated, and and to f/u with PCP for kidney monitoring\par \par f/u 1 week for next euflexxa injections\par \par  \par . \par

## 2022-09-15 NOTE — HISTORY OF PRESENT ILLNESS
[FreeTextEntry1] : Gout, OA knees\par \par States he started drinking tart cherry juice. \par his uric acid was 8.1. \par \par He had a flareup of gout he think he had on right 1st MTP. His podiatrist gave him CS injection which was helpful.  \par He took a a medrol pack.  Doesn't think it helped too much.  \par \par He is now more open to allopurinol and will give me a decision next week at next MIN.  \par \par \par

## 2022-09-22 ENCOUNTER — APPOINTMENT (OUTPATIENT)
Dept: RHEUMATOLOGY | Facility: CLINIC | Age: 62
End: 2022-09-22

## 2022-09-22 VITALS
TEMPERATURE: 96.2 F | OXYGEN SATURATION: 97 % | BODY MASS INDEX: 35.68 KG/M2 | HEART RATE: 66 BPM | SYSTOLIC BLOOD PRESSURE: 150 MMHG | WEIGHT: 222 LBS | HEIGHT: 66 IN | DIASTOLIC BLOOD PRESSURE: 80 MMHG

## 2022-09-22 PROCEDURE — 20610 DRAIN/INJ JOINT/BURSA W/O US: CPT | Mod: 50

## 2022-09-22 RX ORDER — HYALURONATE SODIUM 20 MG/2 ML
20 SYRINGE (ML) INTRAARTICULAR
Refills: 0 | Status: COMPLETED | OUTPATIENT
Start: 2022-09-22

## 2022-09-22 RX ADMIN — Medication 0 MG/2ML: at 00:00

## 2022-09-26 NOTE — ASSESSMENT
[FreeTextEntry1] : f/u 1 week for next set of euflexxa injections\par he is now wanting to start allopurinol 100mg daily\par Also advised to start colchicine 0.6mg QOD\par labs 1 month on allopurinol

## 2022-09-26 NOTE — PROCEDURE
[FreeTextEntry1] : Date: 9/22/22\par Procedure: b/L Euflexxa injections\par \par The procedure risks were discussed with the patient.\par Indications: Therapeutic purposes\par #1 site identified in the Right knee. Verbal consent was obtained.\par The patient was prepped with butadiene solution.\par Topical Anesthesia was with ethyl chloride.\par A 21 Gauge 1.5 inch needle was used. 1mL of 1% xylocaine was used for local anesthetic. \par Injectables: Euflexxa syringe\par The patient tolerated the procedure well\par \par . #2 Identical procedure repeated in left knee. \par There were no complications. Handouts/patient instructions were given to patient. Patient was instructed to call if redness at site, a decrease in range of motion, or significantly increased amount of pain is noted after the procedure.\par \par

## 2022-09-29 ENCOUNTER — APPOINTMENT (OUTPATIENT)
Dept: RHEUMATOLOGY | Facility: CLINIC | Age: 62
End: 2022-09-29

## 2022-09-29 VITALS
SYSTOLIC BLOOD PRESSURE: 138 MMHG | DIASTOLIC BLOOD PRESSURE: 82 MMHG | TEMPERATURE: 98 F | WEIGHT: 217 LBS | HEART RATE: 76 BPM | BODY MASS INDEX: 35.02 KG/M2 | OXYGEN SATURATION: 96 %

## 2022-09-29 PROCEDURE — 20610 DRAIN/INJ JOINT/BURSA W/O US: CPT | Mod: 50

## 2022-09-29 RX ORDER — HYALURONATE SODIUM 20 MG/2 ML
20 SYRINGE (ML) INTRAARTICULAR
Refills: 0 | Status: COMPLETED | OUTPATIENT
Start: 2022-09-29

## 2022-09-29 RX ADMIN — Medication 0 MG/2ML: at 00:00

## 2022-09-29 NOTE — ASSESSMENT
[FreeTextEntry1] : completed 3 euflexxa b/l knee injections\par started allopurinol, started last week.  tolerating well\par also on colchicine 0.6mg QOD\par labs one month on allopurinol\par f/u 3-4 months

## 2022-09-29 NOTE — PROCEDURE
[FreeTextEntry1] : Date: 9/22/22\par Procedure: b/L Euflexxa injections\par \par The procedure risks were discussed with the patient.\par Indications: Therapeutic purposes\par #1 site identified in the Right knee. Verbal consent was obtained.\par The patient was prepped with butadiene solution.\par Topical Anesthesia was with ethyl chloride.\par A 21 Gauge 1.5 inch needle was used. 1mL of 1% xylocaine was used for local anesthetic. \par Injectables: Euflexxa syringe\par The patient tolerated the procedure well\par \par . #2 Identical procedure repeated in left knee. \par There were no complications. Handouts/patient instructions were given to patient. Patient was instructed to call if redness at site, a decrease in range of motion, or significantly increased amount of pain is noted after the procedure.\par \par \par

## 2022-11-01 ENCOUNTER — NON-APPOINTMENT (OUTPATIENT)
Age: 62
End: 2022-11-01

## 2023-01-30 ENCOUNTER — OFFICE (OUTPATIENT)
Dept: URBAN - METROPOLITAN AREA CLINIC 109 | Facility: CLINIC | Age: 63
Setting detail: OPHTHALMOLOGY
End: 2023-01-30
Payer: COMMERCIAL

## 2023-01-30 DIAGNOSIS — H10.89: ICD-10-CM

## 2023-01-30 PROCEDURE — 99213 OFFICE O/P EST LOW 20 MIN: CPT | Performed by: OPHTHALMOLOGY

## 2023-01-30 ASSESSMENT — REFRACTION_MANIFEST
OD_VA1: 20/25
OD_CYLINDER: -0.25
OS_AXIS: 19
OS_ADD: +2.25
OS_SPHERE: +1.25
OD_ADD: +2.25
OD_AXIS: 100
OD_SPHERE: +1.25
OS_VA1: 20/25
OS_CYLINDER: -0.25

## 2023-01-30 ASSESSMENT — LID EXAM ASSESSMENTS
OD_BLEPHARITIS: T
OD_DERMATOCHALASIS: 1+ 2+
OS_BLEPHARITIS: T
OS_DERMATOCHALASIS: 1+ 2+

## 2023-01-30 ASSESSMENT — REFRACTION_CURRENTRX
OD_OVR_VA: 20/
OD_CYLINDER: -0.75
OS_CYLINDER: -1.00
OS_OVR_VA: 20/
OD_AXIS: 99
OS_AXIS: 19
OD_SPHERE: +3.00
OS_SPHERE: +3.00

## 2023-01-30 ASSESSMENT — CONFRONTATIONAL VISUAL FIELD TEST (CVF)
OS_FINDINGS: FULL
OD_FINDINGS: FULL

## 2023-01-30 ASSESSMENT — SPHEQUIV_DERIVED
OD_SPHEQUIV: 1.125
OS_SPHEQUIV: 1.125

## 2023-01-30 ASSESSMENT — VISUAL ACUITY
OS_BCVA: 20/40+1
OD_BCVA: 20/25

## 2023-02-18 ENCOUNTER — EMERGENCY (EMERGENCY)
Facility: HOSPITAL | Age: 63
LOS: 1 days | Discharge: HOME | End: 2023-02-18
Attending: EMERGENCY MEDICINE | Admitting: EMERGENCY MEDICINE
Payer: COMMERCIAL

## 2023-02-18 VITALS
RESPIRATION RATE: 18 BRPM | WEIGHT: 220.02 LBS | HEART RATE: 100 BPM | OXYGEN SATURATION: 95 % | SYSTOLIC BLOOD PRESSURE: 170 MMHG | HEIGHT: 66 IN | TEMPERATURE: 97 F | DIASTOLIC BLOOD PRESSURE: 91 MMHG

## 2023-02-18 PROCEDURE — 99284 EMERGENCY DEPT VISIT MOD MDM: CPT | Mod: 25

## 2023-02-18 PROCEDURE — G1004: CPT

## 2023-02-18 PROCEDURE — 70450 CT HEAD/BRAIN W/O DYE: CPT | Mod: 26,MG

## 2023-02-18 PROCEDURE — 70450 CT HEAD/BRAIN W/O DYE: CPT | Mod: MG

## 2023-02-18 PROCEDURE — 72125 CT NECK SPINE W/O DYE: CPT | Mod: MG

## 2023-02-18 PROCEDURE — 99284 EMERGENCY DEPT VISIT MOD MDM: CPT

## 2023-02-18 PROCEDURE — 72125 CT NECK SPINE W/O DYE: CPT | Mod: 26,MG

## 2023-02-18 RX ORDER — ACETAMINOPHEN 500 MG
975 TABLET ORAL ONCE
Refills: 0 | Status: DISCONTINUED | OUTPATIENT
Start: 2023-02-18 | End: 2023-02-21

## 2023-02-18 NOTE — ED PROVIDER NOTE - PROVIDER TOKENS
PROVIDER:[TOKEN:[[id number]],FOLLOWUP:[1-3 Days]],FREE:[LAST:[your primary care doctor],PHONE:[(   )    -],FAX:[(   )    -],FOLLOWUP:[1-3 Days]]
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
## 2023-02-18 NOTE — ED PROVIDER NOTE - PROGRESS NOTE DETAILS
Patient feeling better, CT results discussed and copy provided. CT shows air fluid level at mastoid. no clinical concern for mastoiditis. no mastoid tenderness. no ear pain. Recommend follow up with pmd and ENT. Patient understands return precautions.
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
## 2023-02-18 NOTE — ED PROVIDER NOTE - MUSCULOSKELETAL, MLM
moving all extremities. + bilateral upper trapezius tenderness. no midline cervical tenderness with FROM of neck. radial pulses equal and intact bilaterally. FROM of all extremities.

## 2023-02-18 NOTE — ED PROVIDER NOTE - ENMT, MLM
Airway patent, Nasal mucosa clear. Mouth with normal mucosa. Throat has no vesicles, no oropharyngeal exudates and uvula is midline. no hemotympanum bilaterally. no mastoid tenderness.

## 2023-02-18 NOTE — ED PROVIDER NOTE - PATIENT PORTAL LINK FT
You can access the FollowMyHealth Patient Portal offered by [facility] by registering at the following website: [email or web address]. By joining [facility]’s FollowMyHealth portal, you will also be able to view your health information using other applications (apps) compatible with our system.
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
## 2023-02-18 NOTE — ED ADULT NURSE NOTE - NSICDXPASTMEDICALHX_GEN_ALL_CORE_FT
PAST MEDICAL HISTORY:  Allergic asthma     Arthritis     Bradycardia     Family history of coronary artery disease     GERD (gastroesophageal reflux disease)     Pacemaker inserted 2008 after a syncopal episode    Syncope

## 2023-02-18 NOTE — ED PROVIDER NOTE - NS ED ATTENDING STATEMENT MOD
This was a shared visit with the LUZMARIA. I reviewed and verified the documentation and independently performed the documented:

## 2023-02-27 ENCOUNTER — RX RENEWAL (OUTPATIENT)
Age: 63
End: 2023-02-27

## 2023-05-09 ENCOUNTER — APPOINTMENT (OUTPATIENT)
Dept: RHEUMATOLOGY | Facility: CLINIC | Age: 63
End: 2023-05-09
Payer: COMMERCIAL

## 2023-05-09 ENCOUNTER — TRANSCRIPTION ENCOUNTER (OUTPATIENT)
Age: 63
End: 2023-05-09

## 2023-05-09 VITALS
SYSTOLIC BLOOD PRESSURE: 157 MMHG | WEIGHT: 217 LBS | TEMPERATURE: 98 F | HEIGHT: 66 IN | DIASTOLIC BLOOD PRESSURE: 81 MMHG | BODY MASS INDEX: 34.87 KG/M2 | HEART RATE: 76 BPM | OXYGEN SATURATION: 97 %

## 2023-05-09 DIAGNOSIS — M79.10 MYALGIA, UNSPECIFIED SITE: ICD-10-CM

## 2023-05-09 PROCEDURE — 36415 COLL VENOUS BLD VENIPUNCTURE: CPT

## 2023-05-09 PROCEDURE — 99214 OFFICE O/P EST MOD 30 MIN: CPT | Mod: 25

## 2023-05-09 RX ORDER — ROSUVASTATIN CALCIUM 5 MG/1
5 TABLET, FILM COATED ORAL
Qty: 90 | Refills: 0 | Status: DISCONTINUED | COMMUNITY
Start: 2020-12-14 | End: 2023-05-09

## 2023-05-10 LAB — URATE SERPL-MCNC: 7.6 MG/DL

## 2023-05-10 NOTE — ASSESSMENT
[FreeTextEntry1] : OA, gout, neck pain\par \par \par Gout- quiescent\par -To take colchicine 1.2mg at onset of flare, the 0.6mg one hour later\par -Can apply voltaren gel to instep of foot as well\par -Continue allopurinol 200mg daily. Check uric acid today.   Admits to sometimes only taking 100mg sometimes.  He admits most of the time he takes 100mg rather than 200mg.  \par -Counseled on diet\par \par Neck pain\par -C spine\par -Currently takes OTC advil 3x/week.   Reviewed potential side effects including but not limited to GI bleeding/Ulcers, kidney/liver dysfunction, elevated BP, and association with heart attack/stroke.\par \par \par OA- \par -repeat Euflexxa injections.  Rx ordered\par -Ketorolac 30mg Im injection 08/11/22.  Will hold off on this for now given Cr. Advised him to try ES tylenol as needed.  \par \par hx of elevated creatinine-\par -has hx of elevated creatinine. Latest is in normal range\par -Advised to keep his weight down, BP under control, stay well hydrated, and and to f/u with PCP for kidney monitoring\par \par f/u 3 months RPA\par MIN as soon as available. \par May benefit from PT of neck. Will await C-spine XR results.

## 2023-05-10 NOTE — HISTORY OF PRESENT ILLNESS
[FreeTextEntry1] : Gout, OA knees\par \par Feels some aching left forearm and also his neck.  Sometimes feels shooting pain.  He sometimes notices it when he lays down at night.\par He is taking advil 3x/week at most.  Cr noted to be 1.37.  \par It has been bothering him about 1 year.  \par Pain level is 2/10 in intensity.  \par \par States he started drinking tart cherry juice. \par uric acid needs to be updated.  \par \par No gout flareups since last visit in september.  \par \par He is interested in having the Euflexxa injections done again for his knees, as they provided him significant relief, > 50%. \par

## 2023-05-11 ENCOUNTER — NON-APPOINTMENT (OUTPATIENT)
Age: 63
End: 2023-05-11

## 2023-05-16 ENCOUNTER — RX RENEWAL (OUTPATIENT)
Age: 63
End: 2023-05-16

## 2023-06-08 ENCOUNTER — APPOINTMENT (OUTPATIENT)
Dept: RHEUMATOLOGY | Facility: CLINIC | Age: 63
End: 2023-06-08
Payer: COMMERCIAL

## 2023-06-08 VITALS
SYSTOLIC BLOOD PRESSURE: 159 MMHG | HEART RATE: 69 BPM | TEMPERATURE: 98 F | WEIGHT: 220 LBS | OXYGEN SATURATION: 95 % | BODY MASS INDEX: 35.36 KG/M2 | DIASTOLIC BLOOD PRESSURE: 88 MMHG | HEIGHT: 66 IN

## 2023-06-08 PROCEDURE — 20610 DRAIN/INJ JOINT/BURSA W/O US: CPT | Mod: 50

## 2023-06-08 RX ORDER — HYALURONATE SODIUM 20 MG/2 ML
20 SYRINGE (ML) INTRAARTICULAR
Refills: 0 | Status: COMPLETED | OUTPATIENT
Start: 2023-06-08

## 2023-06-08 RX ADMIN — Medication 0 MG/2ML: at 00:00

## 2023-06-08 NOTE — PROCEDURE
[FreeTextEntry1] : Date 6/8/23\par Procedure: b/L Euflexxa injections\par \par The procedure risks were discussed with the patient.\par Indications: Therapeutic purposes\par #1 site identified in the Right knee. Verbal consent was obtained.\par The patient was prepped with butadiene solution.\par Topical Anesthesia was with ethyl chloride.\par A 21 Gauge 1.5 inch needle was used. 1mL of 1% xylocaine was used for local anesthetic. \par Aspirate: 10 cc of clear yellow fluid aspirated prior to injecting Euflexxa.\par Injectables: Euflexxa syringe\par The patient tolerated the procedure well\par \par . #2 Identical procedure repeated in left knee, except no fluid aspirated in left knee\par There were no complications. Handouts/patient instructions were given to patient. Patient was instructed to call if redness at site, a decrease in range of motion, or significantly increased amount of pain is noted after the procedure.\par \par

## 2023-06-08 NOTE — ASSESSMENT
[FreeTextEntry1] : f/u 1 week for 2nd set of euflexxa injections\par Also will obtain results of C spine Xr from [facility]

## 2023-06-14 LAB
B PERT IGG+IGM PNL SER: ABNORMAL
COLOR FLD: YELLOW
EOSINOPHIL # FLD MANUAL: 0 %
FLUID INTAKE SUBSTANCE CLASS: NORMAL
LYMPHOCYTES # FLD MANUAL: 29 %
MESOTHL CELL NFR FLD: 0 %
MONOS+MACROS NFR FLD MANUAL: 59 %
NEUTS SEG # FLD MANUAL: 12 %
NRBC # FLD: 0 %
RBC # FLD MANUAL: 1000 /UL
SYCRY CLARITY: ABNORMAL
SYCRY COLOR: YELLOW
SYCRY ID: ABNORMAL
SYCRY TUBE: NORMAL
TOTAL CELLS COUNTED FLD: 183 /UL
TUBE TYPE: NORMAL
UNIDENT CELLS NFR FLD MANUAL: 0 %
VARIANT LYMPHS # FLD MANUAL: 0 %

## 2023-06-15 ENCOUNTER — APPOINTMENT (OUTPATIENT)
Dept: RHEUMATOLOGY | Facility: CLINIC | Age: 63
End: 2023-06-15
Payer: COMMERCIAL

## 2023-06-15 VITALS
DIASTOLIC BLOOD PRESSURE: 84 MMHG | WEIGHT: 220 LBS | SYSTOLIC BLOOD PRESSURE: 172 MMHG | TEMPERATURE: 98.2 F | HEIGHT: 66 IN | BODY MASS INDEX: 35.36 KG/M2 | HEART RATE: 71 BPM | OXYGEN SATURATION: 95 %

## 2023-06-15 DIAGNOSIS — M53.9 DORSOPATHY, UNSPECIFIED: ICD-10-CM

## 2023-06-15 PROCEDURE — 99214 OFFICE O/P EST MOD 30 MIN: CPT | Mod: 25

## 2023-06-15 PROCEDURE — 20610 DRAIN/INJ JOINT/BURSA W/O US: CPT | Mod: 50

## 2023-06-15 RX ORDER — HYALURONATE SODIUM 20 MG/2 ML
20 SYRINGE (ML) INTRAARTICULAR
Refills: 0 | Status: COMPLETED | OUTPATIENT
Start: 2023-06-15

## 2023-06-15 RX ADMIN — Medication 0 MG/2ML: at 00:00

## 2023-06-15 NOTE — HISTORY OF PRESENT ILLNESS
[FreeTextEntry1] : Gout, OA knees, neck pain. \par \par \par Patient would like to review C spine XRs\par C spine XR 5/2023: Straightening lordosis, DDD multilvel.  \par \par He has aspiration of right knee last week, fluid shows MSU crystals.  \par He is on allopurinol 200mg daily.  \par Declining uric acid check today, wants to do it next week.  \par \par For Euflexa injections to b/L knee today as well.  \par \par

## 2023-06-15 NOTE — ASSESSMENT
[FreeTextEntry1] : OA\par \par Euflexa, f/u 1 week for 3rd set of Euflexa\par \par C-spine DDD, spasm\par -PT\par t-Rx izanidine 2mg QHS\par \par Gout\par synovial fluid shows MSU intra and extracellular crystals\par declining check uric acid today.  Wants to check it next week\par \par \par rpa 1 week\par

## 2023-06-15 NOTE — PROCEDURE
[FreeTextEntry1] : Date 6/15/23\par Procedure: b/L Euflexxa injections\par \par The procedure risks were discussed with the patient.\par Indications: Therapeutic purposes\par #1 site identified in the Right knee. Verbal consent was obtained.\par The patient was prepped with butadiene solution.\par Topical Anesthesia was with ethyl chloride.\par A 21 Gauge 1.5 inch needle was used. 1mL of 1% xylocaine was used for local anesthetic. \par Aspirate: 0 cc of clear yellow fluid aspirated prior to injecting Euflexxa.\par Injectables: Euflexxa syringe\par The patient tolerated the procedure well\par \par . #2 Identical procedure repeated in left knee.  No fluid aspirated \par There were no complications. Handouts/patient instructions were given to patient. Patient was instructed to call if redness at site, a decrease in range of motion, or significantly increased amount of pain is noted after the procedure.\par

## 2023-06-20 ENCOUNTER — APPOINTMENT (OUTPATIENT)
Dept: RHEUMATOLOGY | Facility: CLINIC | Age: 63
End: 2023-06-20
Payer: COMMERCIAL

## 2023-06-20 VITALS
OXYGEN SATURATION: 96 % | HEART RATE: 68 BPM | BODY MASS INDEX: 35.36 KG/M2 | DIASTOLIC BLOOD PRESSURE: 80 MMHG | HEIGHT: 66 IN | WEIGHT: 220 LBS | TEMPERATURE: 97.2 F | SYSTOLIC BLOOD PRESSURE: 146 MMHG

## 2023-06-20 DIAGNOSIS — M54.2 CERVICALGIA: ICD-10-CM

## 2023-06-20 PROCEDURE — 20610 DRAIN/INJ JOINT/BURSA W/O US: CPT | Mod: 50

## 2023-06-20 RX ORDER — HYALURONATE SODIUM 20 MG/2 ML
20 SYRINGE (ML) INTRAARTICULAR
Refills: 0 | Status: COMPLETED | OUTPATIENT
Start: 2023-06-20

## 2023-06-20 RX ADMIN — Medication 0 MG/2ML: at 00:00

## 2023-06-20 NOTE — PROCEDURE
[FreeTextEntry1] : Date 6/15/23\par Procedure: b/L Euflexxa injections\par \par The procedure risks were discussed with the patient.\par Indications: Therapeutic purposes\par #1 site identified in the Right knee. Verbal consent was obtained.\par The patient was prepped with butadiene solution.\par Topical Anesthesia was with ethyl chloride.\par A 21 Gauge 1.5 inch needle was used. 1mL of 1% xylocaine was used for local anesthetic. \par Aspirate: 0 cc of clear yellow fluid aspirated prior to injecting Euflexxa.\par Injectables: Euflexxa syringe\par The patient tolerated the procedure well\par \par . #2 Identical procedure repeated in left knee. No fluid aspirated \par There were no complications. Handouts/patient instructions were given to patient. Patient was instructed to call if redness at site, a decrease in range of motion, or significantly increased amount of pain is noted after the procedure.\par \par

## 2023-06-21 LAB
ALBUMIN SERPL ELPH-MCNC: 4.3 G/DL
ALP BLD-CCNC: 62 U/L
ALT SERPL-CCNC: 11 U/L
ANION GAP SERPL CALC-SCNC: 19 MMOL/L
AST SERPL-CCNC: 15 U/L
BILIRUB SERPL-MCNC: 0.4 MG/DL
BUN SERPL-MCNC: 19 MG/DL
CALCIUM SERPL-MCNC: 9.1 MG/DL
CHLORIDE SERPL-SCNC: 105 MMOL/L
CO2 SERPL-SCNC: 23 MMOL/L
CREAT SERPL-MCNC: 1.36 MG/DL
EGFR: 58 ML/MIN/1.73M2
GLUCOSE SERPL-MCNC: 133 MG/DL
POTASSIUM SERPL-SCNC: 3.8 MMOL/L
PROT SERPL-MCNC: 6.6 G/DL
SODIUM SERPL-SCNC: 147 MMOL/L
URATE SERPL-MCNC: 6.6 MG/DL

## 2023-06-22 ENCOUNTER — NON-APPOINTMENT (OUTPATIENT)
Age: 63
End: 2023-06-22

## 2023-09-14 ENCOUNTER — APPOINTMENT (OUTPATIENT)
Dept: RHEUMATOLOGY | Facility: CLINIC | Age: 63
End: 2023-09-14
Payer: COMMERCIAL

## 2023-09-14 VITALS
HEART RATE: 72 BPM | OXYGEN SATURATION: 97 % | SYSTOLIC BLOOD PRESSURE: 153 MMHG | TEMPERATURE: 97.4 F | WEIGHT: 218 LBS | BODY MASS INDEX: 35.03 KG/M2 | HEIGHT: 66 IN | DIASTOLIC BLOOD PRESSURE: 82 MMHG

## 2023-09-14 DIAGNOSIS — E79.0 HYPERURICEMIA W/OUT SIGNS OF INFLAMMATORY ARTHRITIS AND TOPHACEOUS DISEASE: ICD-10-CM

## 2023-09-14 DIAGNOSIS — M79.642 PAIN IN LEFT HAND: ICD-10-CM

## 2023-09-14 PROCEDURE — 99214 OFFICE O/P EST MOD 30 MIN: CPT

## 2023-09-14 RX ORDER — PANTOPRAZOLE 40 MG/1
40 TABLET, DELAYED RELEASE ORAL
Refills: 0 | Status: DISCONTINUED | COMMUNITY
End: 2023-09-14

## 2023-09-14 RX ORDER — COLCHICINE 0.6 MG/1
0.6 TABLET ORAL
Qty: 45 | Refills: 0 | Status: DISCONTINUED | COMMUNITY
Start: 2017-11-30 | End: 2023-09-14

## 2023-09-14 RX ORDER — TIZANIDINE HYDROCHLORIDE 2 MG/1
2 CAPSULE ORAL
Qty: 30 | Refills: 0 | Status: DISCONTINUED | COMMUNITY
Start: 2023-06-15 | End: 2023-09-14

## 2023-09-14 RX ORDER — ALLOPURINOL 100 MG/1
100 TABLET ORAL
Qty: 180 | Refills: 0 | Status: DISCONTINUED | COMMUNITY
Start: 2022-09-22 | End: 2023-09-14

## 2023-09-14 RX ORDER — HYALURONATE SODIUM 20 MG/2 ML
20 SYRINGE (ML) INTRAARTICULAR
Qty: 2 | Refills: 0 | Status: ACTIVE | COMMUNITY
Start: 2021-04-15 | End: 1900-01-01

## 2023-09-14 RX ORDER — DICLOFENAC SODIUM 1% 10 MG/G
1 GEL TOPICAL
Qty: 300 | Refills: 5 | Status: ACTIVE | COMMUNITY
Start: 2020-11-13 | End: 1900-01-01

## 2023-09-18 NOTE — ED ADULT TRIAGE NOTE - HAVE YOU HAD COVID IN THE LAST 60 DAYS?
[facility]  Gastroenterology  Consult Note    Patient Name: Tasia Cardona  MRN: [id number]  Admission Date: 9/17/2023  Hospital Length of Stay: 0 days  Code Status: Full Code   Attending Provider: Srini Ferreira MD   Consulting Provider: Rashel Mitchell MD  Primary Care Physician: Brittany, Primary Doctor  Principal Problem:Hematemesis    Inpatient consult to Gastroenterology  Consult performed by: Rashel Mitchell MD  Consult ordered by: Zahra Barrow, NP  Reason for consult: Hematemesis        Subjective:     HPI:  Mr. Cardona is a 21yo M w/ hx of HIV who was admitted to Gastroenterology for 4 days of nausea, vomiting and hematemesis. Patient reports that these symptoms happen intermittently, and have been going on for the last 3-4 months. Patient reports that he will vomit water at first, and then it will quickly progress to bloody vomiting, usually 6-7 times, before resolving spontaneously. It is BRB when the hematemesis occurs and it can usually fill up a small cup when the episode is finished. It usually happens several times a week. Patient reports intermittent NSAID use. Patient is also compliant w/ HIV medication, on biktarvy, most recent Viral load 5 mo undectable. Patient also reports ongoing watery and bloody diarrhea that has been going on for months, happening weekly, but this has resolved as of this presentation. Patient saw CRS 9/12 and was scheduled for EGD/colonoscopy in November.       Past Medical History:   Diagnosis Date    Asthma        History reviewed. No pertinent surgical history.    Review of patient's allergies indicates:   Allergen Reactions    [place] Swelling    Pcn [penicillins] Hives    Pecan nut Swelling    Soy     Sulfa (sulfonamide antibiotics) Hives     Family History       Problem Relation (Age of Onset)    Breast cancer Maternal Grandmother    Cancer Mother, Brother    No Known Problems Father    Ovarian cancer Mother    Pancreatitis Mother          Tobacco Use     Smoking status: Some Days     Types: Cigarettes    Smokeless tobacco: Never   Substance and Sexual Activity    Alcohol use: Yes    Drug use: Never    Sexual activity: Not on file     Review of Systems   Constitutional:  Negative for fatigue and fever.   Respiratory:  Negative for cough, chest tightness and shortness of breath.    Cardiovascular:  Negative for chest pain, palpitations and leg swelling.   Gastrointestinal:  Positive for abdominal pain. Negative for abdominal distention, blood in stool and constipation.   Genitourinary:  Negative for dysuria and urgency.   Skin:  Negative for rash.   Neurological:  Negative for tremors and numbness.     Objective:     Vital Signs (Most Recent):  Temp: 97.9 °F (36.6 °C) (09/18/23 0841)  Pulse: (!) 56 (09/18/23 0841)  Resp: 18 (09/18/23 0841)  BP: 124/72 (09/18/23 0841)  SpO2: 97 % (09/18/23 0841) Vital Signs (24h Range):  Temp:  [97.9 °F (36.6 °C)-99.7 °F (37.6 °C)] 97.9 °F (36.6 °C)  Pulse:  [56-92] 56  Resp:  [16-21] 18  SpO2:  [97 %-99 %] 97 %  BP: (120-137)/(65-86) 124/72     Weight: 69.9 kg (154 lb) (09/17/23 1841)  Body mass index is 22.1 kg/m².      Intake/Output Summary (Last 24 hours) at 9/18/2023 1039  Last data filed at 9/18/2023 1019  Gross per 24 hour   Intake --   Output 325 ml   Net -325 ml       Lines/Drains/Airways       Peripheral Intravenous Line  Duration                  Peripheral IV - Single Lumen 09/17/23 2035 20 G Left;Posterior Hand <1 day                     Physical Exam  Constitutional:       Appearance: He is normal weight.   Cardiovascular:      Rate and Rhythm: Normal rate and regular rhythm.      Pulses: Normal pulses.      Heart sounds: Normal heart sounds.   Pulmonary:      Effort: Pulmonary effort is normal.      Breath sounds: Normal breath sounds.   Abdominal:      General: Abdomen is flat. There is no distension.      Palpations: Abdomen is soft.      Tenderness: There is abdominal tenderness. There is no guarding or rebound.    Skin:     General: Skin is warm.   Neurological:      General: No focal deficit present.      Mental Status: He is alert and oriented to person, place, and time.          Significant Labs:  CBC:   Recent Labs   Lab 09/17/23  2036 09/18/23  0356 09/18/23  0839   WBC 7.74 7.66 6.32   HGB 12.2* 11.1* 11.4*   HCT 37.9* 34.9* 35.9*    262 243     CMP:   Recent Labs   Lab 09/18/23  0356   GLU 85   CALCIUM 7.9*   ALBUMIN 2.9*   PROT 5.4*      K 3.2*   CO2 20*      BUN 8   CREATININE 0.7   ALKPHOS 84   ALT 35   AST 42*   BILITOT 0.5       Significant Imaging:  Imaging results within the past 24 hours have been reviewed.    Assessment/Plan:     GI  * Hematemesis  Pt presented w/ acute on chronic hematemesis, usually preceeded by normal vomiting before quickly progressing to BRB that resolves spontaneously, w/ intermittent NSAID use concerning for recurrent brandy hitchcock tear vs. esophagitis. Pt w/ undetectable viral load/compliance w/ biktarvy.     - Plan for upper endoscopy to assess for source of bleed.  - Trend Hgb and transfuse for goal Hgb > 7, unless otherwise indicated  - Intravascular resuscitation/support with IVF's   - Hold all NSAIDs and anticoagulants, unless contraindicated  - Please notify GI team if there is significant change in patient's clinical status        Thank you for your consult. I will follow-up with patient. Please contact us if you have any additional questions.    Rashel Mitchell MD  Gastroenterology  [facility]   No

## 2023-09-28 ENCOUNTER — RX RENEWAL (OUTPATIENT)
Age: 63
End: 2023-09-28

## 2023-10-17 ENCOUNTER — NON-APPOINTMENT (OUTPATIENT)
Age: 63
End: 2023-10-17

## 2023-10-19 ENCOUNTER — APPOINTMENT (OUTPATIENT)
Dept: RHEUMATOLOGY | Facility: CLINIC | Age: 63
End: 2023-10-19
Payer: COMMERCIAL

## 2023-10-19 PROCEDURE — 99441: CPT

## 2023-10-19 RX ORDER — COLCHICINE 0.6 MG/1
0.6 TABLET ORAL DAILY
Qty: 90 | Refills: 0 | Status: ACTIVE | COMMUNITY
Start: 2023-10-19 | End: 1900-01-01

## 2023-10-19 RX ORDER — FEBUXOSTAT 40 MG/1
40 TABLET ORAL
Qty: 90 | Refills: 0 | Status: ACTIVE | COMMUNITY
Start: 2023-10-19 | End: 1900-01-01

## 2024-01-05 ENCOUNTER — RX RENEWAL (OUTPATIENT)
Age: 64
End: 2024-01-05

## 2024-01-25 ENCOUNTER — APPOINTMENT (OUTPATIENT)
Dept: RHEUMATOLOGY | Facility: CLINIC | Age: 64
End: 2024-01-25

## 2024-02-01 ENCOUNTER — APPOINTMENT (OUTPATIENT)
Dept: RHEUMATOLOGY | Facility: CLINIC | Age: 64
End: 2024-02-01
Payer: COMMERCIAL

## 2024-02-01 VITALS
WEIGHT: 220 LBS | HEIGHT: 66 IN | OXYGEN SATURATION: 95 % | SYSTOLIC BLOOD PRESSURE: 158 MMHG | HEART RATE: 77 BPM | BODY MASS INDEX: 35.36 KG/M2 | TEMPERATURE: 97.6 F | DIASTOLIC BLOOD PRESSURE: 84 MMHG

## 2024-02-01 DIAGNOSIS — M10.9 GOUT, UNSPECIFIED: ICD-10-CM

## 2024-02-01 DIAGNOSIS — M62.838 OTHER MUSCLE SPASM: ICD-10-CM

## 2024-02-01 PROCEDURE — 99214 OFFICE O/P EST MOD 30 MIN: CPT | Mod: 25

## 2024-02-01 PROCEDURE — 20610 DRAIN/INJ JOINT/BURSA W/O US: CPT | Mod: 50

## 2024-02-01 RX ORDER — ALLOPURINOL 300 MG/1
300 TABLET ORAL DAILY
Qty: 90 | Refills: 0 | Status: DISCONTINUED | COMMUNITY
Start: 2023-06-23 | End: 2024-02-01

## 2024-02-01 RX ORDER — HYALURONATE SODIUM 20 MG/2 ML
20 SYRINGE (ML) INTRAARTICULAR
Refills: 0 | Status: COMPLETED | OUTPATIENT
Start: 2024-02-01

## 2024-02-01 RX ADMIN — Medication 0 MG/2ML: at 00:00

## 2024-02-01 NOTE — PHYSICAL EXAM
[TextEntry] : MSK:  + Right knee effusion, no warmth, erythema. +TTP Right lower back trigger point.

## 2024-02-01 NOTE — HISTORY OF PRESENT ILLNESS
[FreeTextEntry1] : Gout, OA knees, neck pain. Right knee aspiration showed MSU crystals  C spine XR 5/2023: Straightening lordosis, DDD multilvel.  We switched to uloric 40mg daily.  He was on allopurinol 300mg daily, but stopped taking it  due to lower abdominal pain. He is otherwise happy with the gout results and he has not had any flareups.    For Euflexxa injections to b/L knee in 1/2024  He went to a PM doctor for his back, went to PT.  The PT told him he he had muscle spasm and could use trigger point injection.  He also took tizanidine 2mg QHS.

## 2024-02-01 NOTE — ASSESSMENT
[FreeTextEntry1] : Gout, OA knees, neck pain. Right knee aspiration showed MSU crystals Cont uloric 40mg daily, lab draw next visit.    Muscle spasm low back C spine XR 5/2023: Straightening lordosis, DDD multilvel. Cont PT Cont tizanidine, can inc to 4mg QHS Heat application daily Consider trigger point  OA Return in 1 week for 2nd set of Euflexxa injections to b/L knee  f/u in 1 week

## 2024-02-01 NOTE — PROCEDURE
[FreeTextEntry1] : Procedure: b/L Euflexxa injections    The procedure risks were discussed with the patient.  Indications: Therapeutic purposes  #1 site identified in the Right knee. Verbal consent was obtained.  The patient was prepped with butadiene solution.  Topical Anesthesia was with ethyl chloride.  A 21 Gauge 1.5 inch needle was used. 1mL of 1% xylocaine was used for local anesthetic.  Aspirate: 5 cc of clear yellow fluid aspirated prior to injecting Euflexxa.  Injectables: Euflexxa syringe  The patient tolerated the procedure well    . #2 Identical procedure repeated in left knee. No fluid aspirated  There were no complications. Handouts/patient instructions were given to patient. Patient was instructed to call if redness at site, a decrease in range of motion, or significantly increased amount of pain is noted after the procedure.

## 2024-02-08 ENCOUNTER — APPOINTMENT (OUTPATIENT)
Dept: RHEUMATOLOGY | Facility: CLINIC | Age: 64
End: 2024-02-08
Payer: COMMERCIAL

## 2024-02-08 VITALS
BODY MASS INDEX: 34.39 KG/M2 | HEART RATE: 84 BPM | DIASTOLIC BLOOD PRESSURE: 88 MMHG | OXYGEN SATURATION: 98 % | WEIGHT: 214 LBS | TEMPERATURE: 97.7 F | SYSTOLIC BLOOD PRESSURE: 148 MMHG | HEIGHT: 66 IN

## 2024-02-08 PROCEDURE — 20610 DRAIN/INJ JOINT/BURSA W/O US: CPT | Mod: 50

## 2024-02-08 RX ORDER — HYALURONATE SODIUM 20 MG/2 ML
20 SYRINGE (ML) INTRAARTICULAR
Refills: 0 | Status: COMPLETED | OUTPATIENT
Start: 2024-02-08

## 2024-02-08 RX ORDER — TIZANIDINE 4 MG/1
TABLET ORAL
Refills: 0 | Status: ACTIVE | COMMUNITY

## 2024-02-08 RX ADMIN — Medication 0 MG/2ML: at 00:00

## 2024-02-15 ENCOUNTER — APPOINTMENT (OUTPATIENT)
Dept: RHEUMATOLOGY | Facility: CLINIC | Age: 64
End: 2024-02-15
Payer: COMMERCIAL

## 2024-02-15 VITALS
BODY MASS INDEX: 34.23 KG/M2 | SYSTOLIC BLOOD PRESSURE: 138 MMHG | HEIGHT: 66 IN | HEART RATE: 88 BPM | OXYGEN SATURATION: 98 % | TEMPERATURE: 98 F | WEIGHT: 213 LBS | DIASTOLIC BLOOD PRESSURE: 80 MMHG

## 2024-02-15 DIAGNOSIS — M17.0 BILATERAL PRIMARY OSTEOARTHRITIS OF KNEE: ICD-10-CM

## 2024-02-15 DIAGNOSIS — M70.62 TROCHANTERIC BURSITIS, LEFT HIP: ICD-10-CM

## 2024-02-15 PROCEDURE — 99213 OFFICE O/P EST LOW 20 MIN: CPT | Mod: 25

## 2024-02-15 PROCEDURE — 20610 DRAIN/INJ JOINT/BURSA W/O US: CPT | Mod: 50,59

## 2024-02-15 RX ORDER — HYALURONATE SODIUM 20 MG/2 ML
20 SYRINGE (ML) INTRAARTICULAR
Qty: 6 | Refills: 0 | Status: ACTIVE | COMMUNITY
Start: 2023-05-09

## 2024-02-15 RX ORDER — HYALURONATE SODIUM 20 MG/2 ML
20 SYRINGE (ML) INTRAARTICULAR
Qty: 6 | Refills: 0 | Status: ACTIVE | COMMUNITY
Start: 2024-01-05

## 2024-02-15 RX ORDER — TRIAMCINOLONE ACETONIDE 80 MG/ML
80 INJECTION, SUSPENSION INTRA-ARTICULAR; INTRAMUSCULAR
Qty: 8 | Refills: 0 | Status: COMPLETED | OUTPATIENT
Start: 2024-02-15

## 2024-02-15 RX ADMIN — TRIAMCINOLONE ACETONIDE 0 MG/ML: 80 INJECTION, SUSPENSION INTRA-ARTICULAR; INTRAMUSCULAR at 00:00

## 2024-02-15 NOTE — PROCEDURE
[FreeTextEntry1] : Procedure: b/L Euflexxa injections   The procedure risks were discussed with the patient.  Indications: Therapeutic purposes  #1 site identified in the Right knee. Verbal consent was obtained.  The patient was prepped with butadiene solution.  Topical Anesthesia was with ethyl chloride.  A 21 Gauge 1.5 inch needle was used. 1mL of 1% xylocaine was used for local anesthetic.  Aspirate:  cc of clear yellow fluid aspirated prior to injecting Euflexxa.  Injectables: Euflexxa syringe  The patient tolerated the procedure well    . #2 Identical procedure repeated in left knee. No fluid aspirated  There were no complications. Handouts/patient instructions were given to patient. Patient was instructed to call if redness at site, a decrease in range of motion, or significantly increased amount of pain is noted after the procedure.   #3 Procedure: Left GTB injection The procedure risks was discussed with the patient. Indications: therapeutic purposes  #1 site identified in the Left GTB . Verbal consent was obtained.  Anesthesia was with ethyl chloride. The patient was prepped with betadine solution.  A 25 gauge 1.5 inch needle was used. Injectables: Kenalog 80 mg was injected into the site The Kenalog was mixed with 1mL of xylocaine 1%.  The patient tolerated the procedure well.. There were no complications. Handouts/patient instructions were given to patient . patient was instructed to call if redness at site, a decrease in range of motion or an increase in pain is noted after procedure.

## 2024-02-26 RX ORDER — HYALURONATE SODIUM 20 MG/2 ML
20 SYRINGE (ML) INTRAARTICULAR
Refills: 0 | Status: COMPLETED | OUTPATIENT
Start: 2024-02-26

## 2024-07-29 ENCOUNTER — NON-APPOINTMENT (OUTPATIENT)
Age: 64
End: 2024-07-29

## 2024-07-30 ENCOUNTER — APPOINTMENT (OUTPATIENT)
Dept: RHEUMATOLOGY | Facility: CLINIC | Age: 64
End: 2024-07-30
Payer: COMMERCIAL

## 2024-07-30 VITALS
WEIGHT: 218 LBS | HEART RATE: 77 BPM | TEMPERATURE: 97.6 F | HEIGHT: 66 IN | BODY MASS INDEX: 35.03 KG/M2 | DIASTOLIC BLOOD PRESSURE: 82 MMHG | SYSTOLIC BLOOD PRESSURE: 138 MMHG | OXYGEN SATURATION: 95 %

## 2024-07-30 VITALS — WEIGHT: 215 LBS | BODY MASS INDEX: 34.7 KG/M2

## 2024-07-30 DIAGNOSIS — M70.62 TROCHANTERIC BURSITIS, LEFT HIP: ICD-10-CM

## 2024-07-30 DIAGNOSIS — M10.9 GOUT, UNSPECIFIED: ICD-10-CM

## 2024-07-30 DIAGNOSIS — M17.0 BILATERAL PRIMARY OSTEOARTHRITIS OF KNEE: ICD-10-CM

## 2024-07-30 DIAGNOSIS — M53.9 DORSOPATHY, UNSPECIFIED: ICD-10-CM

## 2024-07-30 DIAGNOSIS — E79.0 HYPERURICEMIA W/OUT SIGNS OF INFLAMMATORY ARTHRITIS AND TOPHACEOUS DISEASE: ICD-10-CM

## 2024-07-30 PROCEDURE — 99214 OFFICE O/P EST MOD 30 MIN: CPT

## 2024-09-10 ENCOUNTER — APPOINTMENT (OUTPATIENT)
Dept: RHEUMATOLOGY | Facility: CLINIC | Age: 64
End: 2024-09-10
Payer: COMMERCIAL

## 2024-09-10 VITALS
BODY MASS INDEX: 34.07 KG/M2 | OXYGEN SATURATION: 96 % | HEART RATE: 75 BPM | SYSTOLIC BLOOD PRESSURE: 148 MMHG | DIASTOLIC BLOOD PRESSURE: 88 MMHG | HEIGHT: 66 IN | TEMPERATURE: 97.2 F | WEIGHT: 212 LBS

## 2024-09-10 PROCEDURE — 20610 DRAIN/INJ JOINT/BURSA W/O US: CPT | Mod: 50

## 2024-09-10 RX ORDER — HYALURONATE SODIUM 20 MG/2 ML
20 SYRINGE (ML) INTRAARTICULAR
Refills: 0 | Status: COMPLETED | OUTPATIENT
Start: 2024-09-10

## 2024-09-10 RX ADMIN — Medication 0 MG/2ML: at 00:00

## 2024-09-17 ENCOUNTER — APPOINTMENT (OUTPATIENT)
Dept: RHEUMATOLOGY | Facility: CLINIC | Age: 64
End: 2024-09-17
Payer: COMMERCIAL

## 2024-09-17 VITALS
BODY MASS INDEX: 34.55 KG/M2 | HEIGHT: 66 IN | SYSTOLIC BLOOD PRESSURE: 118 MMHG | HEART RATE: 78 BPM | DIASTOLIC BLOOD PRESSURE: 76 MMHG | TEMPERATURE: 98 F | WEIGHT: 215 LBS | OXYGEN SATURATION: 98 %

## 2024-09-17 DIAGNOSIS — M17.0 BILATERAL PRIMARY OSTEOARTHRITIS OF KNEE: ICD-10-CM

## 2024-09-17 PROCEDURE — 20610 DRAIN/INJ JOINT/BURSA W/O US: CPT | Mod: 50

## 2024-09-17 RX ORDER — HYALURONATE SODIUM 20 MG/2 ML
20 SYRINGE (ML) INTRAARTICULAR
Refills: 0 | Status: COMPLETED | OUTPATIENT
Start: 2024-09-17

## 2024-09-17 RX ADMIN — Medication 0 MG/2ML: at 00:00

## 2024-09-17 NOTE — PROCEDURE
[FreeTextEntry1] : Procedure: b/L Euflexxa injections    The procedure risks were discussed with the patient.  Indications: Therapeutic purposes  #1 site identified in the Right knee. Verbal consent was obtained.  The patient was prepped with butadiene solution.  Topical Anesthesia was with ethyl chloride.  A 21 Gauge 1.5 inch needle was used. 1mL of 1% xylocaine was used for local anesthetic.  Aspirate: 0 cc of clear yellow fluid aspirated prior to injecting Euflexxa.  Injectables: Euflexxa syringe  The patient tolerated the procedure well    . #2 Identical procedure repeated in left knee. No fluid aspirated  There were no complications. Handouts/patient instructions were given to patient. Patient was instructed to call if redness at site, a decrease in range of motion, or significantly increased amount of pain is noted after the procedure.

## 2024-09-24 ENCOUNTER — APPOINTMENT (OUTPATIENT)
Dept: RHEUMATOLOGY | Facility: CLINIC | Age: 64
End: 2024-09-24

## 2024-10-01 ENCOUNTER — APPOINTMENT (OUTPATIENT)
Dept: RHEUMATOLOGY | Facility: CLINIC | Age: 64
End: 2024-10-01
Payer: COMMERCIAL

## 2024-10-01 VITALS
TEMPERATURE: 98.7 F | HEART RATE: 75 BPM | BODY MASS INDEX: 34.55 KG/M2 | WEIGHT: 215 LBS | DIASTOLIC BLOOD PRESSURE: 83 MMHG | SYSTOLIC BLOOD PRESSURE: 154 MMHG | HEIGHT: 66 IN | OXYGEN SATURATION: 98 %

## 2024-10-01 DIAGNOSIS — M17.0 BILATERAL PRIMARY OSTEOARTHRITIS OF KNEE: ICD-10-CM

## 2024-10-01 PROCEDURE — 20610 DRAIN/INJ JOINT/BURSA W/O US: CPT | Mod: 50

## 2024-10-01 RX ORDER — HYALURONATE SODIUM 20 MG/2 ML
20 SYRINGE (ML) INTRAARTICULAR
Refills: 0 | Status: COMPLETED | OUTPATIENT
Start: 2024-10-01

## 2024-10-01 RX ADMIN — Medication 0 MG/2ML: at 00:00

## 2024-10-08 ENCOUNTER — APPOINTMENT (OUTPATIENT)
Dept: RHEUMATOLOGY | Facility: CLINIC | Age: 64
End: 2024-10-08
Payer: COMMERCIAL

## 2024-10-08 VITALS
HEIGHT: 66 IN | SYSTOLIC BLOOD PRESSURE: 159 MMHG | DIASTOLIC BLOOD PRESSURE: 87 MMHG | WEIGHT: 215 LBS | OXYGEN SATURATION: 96 % | HEART RATE: 82 BPM | TEMPERATURE: 97.6 F | BODY MASS INDEX: 34.55 KG/M2

## 2024-10-08 DIAGNOSIS — M70.50 OTHER BURSITIS OF KNEE, UNSPECIFIED KNEE: ICD-10-CM

## 2024-10-08 DIAGNOSIS — M25.461 EFFUSION, RIGHT KNEE: ICD-10-CM

## 2024-10-08 PROCEDURE — 99213 OFFICE O/P EST LOW 20 MIN: CPT | Mod: 25

## 2024-10-08 PROCEDURE — 20610 DRAIN/INJ JOINT/BURSA W/O US: CPT | Mod: RT

## 2024-10-08 RX ORDER — TRIAMCINOLONE ACETONIDE 40 MG/ML
40 SUSPENSION INTRA-ARTERIAL; INTRAMUSCULAR
Refills: 0 | Status: COMPLETED | OUTPATIENT
Start: 2024-10-08

## 2024-10-08 RX ADMIN — TRIAMCINOLONE ACETONIDE 0 MG/ML: 40 INJECTION, SUSPENSION INTRA-ARTICULAR; INTRAMUSCULAR at 00:00

## 2024-10-22 NOTE — REASON FOR VISIT
[Follow-Up: _____] : a [unfilled] follow-up visit Show Spray Paint Technique Variable?: Yes Spray Paint Text: The liquid nitrogen was applied to the skin utilizing a spray paint frosting technique. Detail Level: Detailed Consent: The patient's consent was obtained including but not limited to risks of crusting, scabbing, blistering, scarring, darker or lighter pigmentary change, recurrence, incomplete removal and infection. The patient understands that the procedure is cosmetic in nature and is not covered by insurance. Post-Care Instructions: I reviewed with the patient in detail post-care instructions. Patient is to wear sunprotection, and avoid picking at any of the treated lesions. Pt may apply Vaseline to crusted or scabbing areas. Render Post-Care Instructions In Note?: no Billing Information: Bill by [employer]

## 2024-11-01 ENCOUNTER — RX RENEWAL (OUTPATIENT)
Age: 64
End: 2024-11-01

## 2024-12-11 ENCOUNTER — RX RENEWAL (OUTPATIENT)
Age: 64
End: 2024-12-11

## 2025-01-07 ENCOUNTER — APPOINTMENT (OUTPATIENT)
Dept: OPHTHALMOLOGY | Facility: CLINIC | Age: 65
End: 2025-01-07
Payer: COMMERCIAL

## 2025-01-07 ENCOUNTER — NON-APPOINTMENT (OUTPATIENT)
Age: 65
End: 2025-01-07

## 2025-01-07 PROCEDURE — 92025 CPTRIZED CORNEAL TOPOGRAPHY: CPT

## 2025-01-07 PROCEDURE — 92134 CPTRZ OPH DX IMG PST SGM RTA: CPT

## 2025-01-07 PROCEDURE — 92004 COMPRE OPH EXAM NEW PT 1/>: CPT

## 2025-01-27 ENCOUNTER — NON-APPOINTMENT (OUTPATIENT)
Age: 65
End: 2025-01-27

## 2025-02-06 ENCOUNTER — APPOINTMENT (OUTPATIENT)
Dept: RHEUMATOLOGY | Facility: CLINIC | Age: 65
End: 2025-02-06
Payer: COMMERCIAL

## 2025-02-06 VITALS
WEIGHT: 210 LBS | HEART RATE: 66 BPM | TEMPERATURE: 97.5 F | DIASTOLIC BLOOD PRESSURE: 80 MMHG | SYSTOLIC BLOOD PRESSURE: 160 MMHG | BODY MASS INDEX: 33.75 KG/M2 | HEIGHT: 66 IN | OXYGEN SATURATION: 97 %

## 2025-02-06 DIAGNOSIS — M70.62 TROCHANTERIC BURSITIS, LEFT HIP: ICD-10-CM

## 2025-02-06 DIAGNOSIS — M17.0 BILATERAL PRIMARY OSTEOARTHRITIS OF KNEE: ICD-10-CM

## 2025-02-06 DIAGNOSIS — M53.9 DORSOPATHY, UNSPECIFIED: ICD-10-CM

## 2025-02-06 DIAGNOSIS — M10.9 GOUT, UNSPECIFIED: ICD-10-CM

## 2025-02-06 PROCEDURE — 99214 OFFICE O/P EST MOD 30 MIN: CPT

## 2025-02-06 RX ORDER — HYALURONATE SODIUM 20 MG/2 ML
20 SYRINGE (ML) INTRAARTICULAR
Qty: 6 | Refills: 0 | Status: ACTIVE | COMMUNITY
Start: 2025-02-06 | End: 1900-01-01

## 2025-02-10 ENCOUNTER — RX RENEWAL (OUTPATIENT)
Age: 65
End: 2025-02-10

## 2025-02-10 ENCOUNTER — APPOINTMENT (OUTPATIENT)
Dept: OPHTHALMOLOGY | Facility: CLINIC | Age: 65
End: 2025-02-10
Payer: COMMERCIAL

## 2025-02-10 PROCEDURE — ZZZZZ: CPT | Mod: NC

## 2025-03-03 ENCOUNTER — APPOINTMENT (OUTPATIENT)
Dept: OPHTHALMOLOGY | Facility: AMBULATORY SURGERY CENTER | Age: 65
End: 2025-03-03
Payer: COMMERCIAL

## 2025-03-03 PROCEDURE — 66984 XCAPSL CTRC RMVL W/O ECP: CPT | Mod: RT

## 2025-03-04 ENCOUNTER — NON-APPOINTMENT (OUTPATIENT)
Age: 65
End: 2025-03-04

## 2025-03-04 ENCOUNTER — APPOINTMENT (OUTPATIENT)
Dept: OPHTHALMOLOGY | Facility: CLINIC | Age: 65
End: 2025-03-04
Payer: COMMERCIAL

## 2025-03-04 PROCEDURE — 99024 POSTOP FOLLOW-UP VISIT: CPT

## 2025-03-11 ENCOUNTER — NON-APPOINTMENT (OUTPATIENT)
Age: 65
End: 2025-03-11

## 2025-03-11 ENCOUNTER — APPOINTMENT (OUTPATIENT)
Dept: OPHTHALMOLOGY | Facility: CLINIC | Age: 65
End: 2025-03-11
Payer: COMMERCIAL

## 2025-03-11 PROCEDURE — 99024 POSTOP FOLLOW-UP VISIT: CPT

## 2025-03-11 PROCEDURE — 92134 CPTRZ OPH DX IMG PST SGM RTA: CPT

## 2025-03-31 ENCOUNTER — APPOINTMENT (OUTPATIENT)
Dept: OPHTHALMOLOGY | Facility: AMBULATORY SURGERY CENTER | Age: 65
End: 2025-03-31
Payer: COMMERCIAL

## 2025-03-31 PROCEDURE — 66984 XCAPSL CTRC RMVL W/O ECP: CPT | Mod: LT,79

## 2025-04-01 ENCOUNTER — APPOINTMENT (OUTPATIENT)
Dept: OPHTHALMOLOGY | Facility: CLINIC | Age: 65
End: 2025-04-01
Payer: COMMERCIAL

## 2025-04-01 ENCOUNTER — NON-APPOINTMENT (OUTPATIENT)
Age: 65
End: 2025-04-01

## 2025-04-01 PROCEDURE — 99024 POSTOP FOLLOW-UP VISIT: CPT

## 2025-04-08 ENCOUNTER — APPOINTMENT (OUTPATIENT)
Dept: OPHTHALMOLOGY | Facility: CLINIC | Age: 65
End: 2025-04-08
Payer: COMMERCIAL

## 2025-04-08 ENCOUNTER — NON-APPOINTMENT (OUTPATIENT)
Age: 65
End: 2025-04-08

## 2025-04-08 PROCEDURE — 99024 POSTOP FOLLOW-UP VISIT: CPT

## 2025-04-29 ENCOUNTER — APPOINTMENT (OUTPATIENT)
Dept: OPHTHALMOLOGY | Facility: CLINIC | Age: 65
End: 2025-04-29

## 2025-04-30 ENCOUNTER — NON-APPOINTMENT (OUTPATIENT)
Age: 65
End: 2025-04-30

## 2025-04-30 ENCOUNTER — APPOINTMENT (OUTPATIENT)
Dept: RHEUMATOLOGY | Facility: CLINIC | Age: 65
End: 2025-04-30

## 2025-04-30 VITALS
HEIGHT: 66 IN | WEIGHT: 215 LBS | SYSTOLIC BLOOD PRESSURE: 155 MMHG | HEART RATE: 79 BPM | BODY MASS INDEX: 34.55 KG/M2 | TEMPERATURE: 97.5 F | OXYGEN SATURATION: 97 % | DIASTOLIC BLOOD PRESSURE: 81 MMHG

## 2025-04-30 DIAGNOSIS — E79.0 HYPERURICEMIA W/OUT SIGNS OF INFLAMMATORY ARTHRITIS AND TOPHACEOUS DISEASE: ICD-10-CM

## 2025-04-30 DIAGNOSIS — M10.9 GOUT, UNSPECIFIED: ICD-10-CM

## 2025-04-30 PROCEDURE — 20610 DRAIN/INJ JOINT/BURSA W/O US: CPT | Mod: 50

## 2025-04-30 PROCEDURE — 99213 OFFICE O/P EST LOW 20 MIN: CPT | Mod: 25

## 2025-04-30 RX ORDER — HYALURONATE SODIUM 20 MG/2 ML
20 SYRINGE (ML) INTRAARTICULAR
Refills: 0 | Status: COMPLETED | OUTPATIENT
Start: 2025-04-30

## 2025-04-30 RX ADMIN — Medication 0 MG/2ML: at 00:00

## 2025-05-01 ENCOUNTER — NON-APPOINTMENT (OUTPATIENT)
Age: 65
End: 2025-05-01

## 2025-05-01 ENCOUNTER — APPOINTMENT (OUTPATIENT)
Dept: OPHTHALMOLOGY | Facility: CLINIC | Age: 65
End: 2025-05-01
Payer: COMMERCIAL

## 2025-05-01 PROCEDURE — 99024 POSTOP FOLLOW-UP VISIT: CPT

## 2025-05-01 PROCEDURE — 92134 CPTRZ OPH DX IMG PST SGM RTA: CPT

## 2025-05-02 LAB — URATE SERPL-MCNC: 8.4 MG/DL

## 2025-05-07 ENCOUNTER — APPOINTMENT (OUTPATIENT)
Dept: RHEUMATOLOGY | Facility: CLINIC | Age: 65
End: 2025-05-07

## 2025-05-07 VITALS
DIASTOLIC BLOOD PRESSURE: 70 MMHG | WEIGHT: 215 LBS | SYSTOLIC BLOOD PRESSURE: 140 MMHG | BODY MASS INDEX: 34.55 KG/M2 | HEART RATE: 69 BPM | OXYGEN SATURATION: 95 % | HEIGHT: 66 IN

## 2025-05-07 PROCEDURE — 20610 DRAIN/INJ JOINT/BURSA W/O US: CPT | Mod: 50

## 2025-05-07 RX ORDER — HYALURONATE SODIUM 20 MG/2 ML
20 SYRINGE (ML) INTRAARTICULAR
Refills: 0 | Status: COMPLETED | OUTPATIENT
Start: 2025-05-07

## 2025-05-07 RX ADMIN — Medication 0 MG/2ML: at 00:00

## 2025-05-14 ENCOUNTER — APPOINTMENT (OUTPATIENT)
Dept: RHEUMATOLOGY | Facility: CLINIC | Age: 65
End: 2025-05-14

## 2025-05-14 VITALS
HEIGHT: 66 IN | OXYGEN SATURATION: 98 % | HEART RATE: 73 BPM | DIASTOLIC BLOOD PRESSURE: 82 MMHG | TEMPERATURE: 97.5 F | WEIGHT: 213 LBS | BODY MASS INDEX: 34.23 KG/M2 | SYSTOLIC BLOOD PRESSURE: 154 MMHG

## 2025-05-14 DIAGNOSIS — M17.0 BILATERAL PRIMARY OSTEOARTHRITIS OF KNEE: ICD-10-CM

## 2025-05-14 PROCEDURE — 20610 DRAIN/INJ JOINT/BURSA W/O US: CPT | Mod: 50

## 2025-05-14 RX ORDER — HYALURONATE SODIUM 20 MG/2 ML
20 SYRINGE (ML) INTRAARTICULAR
Refills: 0 | Status: COMPLETED | OUTPATIENT
Start: 2025-05-14

## 2025-05-14 RX ADMIN — Medication 0 MG/2ML: at 00:00

## 2025-06-11 ENCOUNTER — APPOINTMENT (OUTPATIENT)
Dept: UROLOGY | Facility: CLINIC | Age: 65
End: 2025-06-11
Payer: MEDICARE

## 2025-06-11 VITALS
BODY MASS INDEX: 34.55 KG/M2 | SYSTOLIC BLOOD PRESSURE: 140 MMHG | HEIGHT: 66 IN | OXYGEN SATURATION: 99 % | DIASTOLIC BLOOD PRESSURE: 80 MMHG | HEART RATE: 80 BPM | WEIGHT: 215 LBS | TEMPERATURE: 97 F

## 2025-06-11 PROBLEM — N40.1 BPH WITH LOWER URINARY TRACT SYMPTOMS WITHOUT URINARY OBSTRUCTION: Status: ACTIVE | Noted: 2025-06-11

## 2025-06-11 PROCEDURE — 51798 US URINE CAPACITY MEASURE: CPT

## 2025-06-11 PROCEDURE — 99204 OFFICE O/P NEW MOD 45 MIN: CPT | Mod: 25

## 2025-08-07 ENCOUNTER — APPOINTMENT (OUTPATIENT)
Dept: RHEUMATOLOGY | Facility: CLINIC | Age: 65
End: 2025-08-07
Payer: MEDICARE

## 2025-08-07 VITALS
TEMPERATURE: 97.3 F | HEART RATE: 74 BPM | BODY MASS INDEX: 34.55 KG/M2 | HEIGHT: 66 IN | WEIGHT: 215 LBS | OXYGEN SATURATION: 97 % | DIASTOLIC BLOOD PRESSURE: 84 MMHG | SYSTOLIC BLOOD PRESSURE: 152 MMHG

## 2025-08-07 DIAGNOSIS — M19.049 PRIMARY OSTEOARTHRITIS, UNSPECIFIED HAND: ICD-10-CM

## 2025-08-07 DIAGNOSIS — M70.52 OTHER BURSITIS OF KNEE, LEFT KNEE: ICD-10-CM

## 2025-08-07 DIAGNOSIS — M10.9 GOUT, UNSPECIFIED: ICD-10-CM

## 2025-08-07 PROCEDURE — 99204 OFFICE O/P NEW MOD 45 MIN: CPT

## 2025-08-07 PROCEDURE — 36415 COLL VENOUS BLD VENIPUNCTURE: CPT

## 2025-08-11 LAB
ALBUMIN SERPL ELPH-MCNC: 4.2 G/DL
ALP BLD-CCNC: 62 U/L
ALT SERPL-CCNC: 35 U/L
ANION GAP SERPL CALC-SCNC: 12 MMOL/L
AST SERPL-CCNC: 30 U/L
BILIRUB SERPL-MCNC: 0.6 MG/DL
BUN SERPL-MCNC: 16 MG/DL
CALCIUM SERPL-MCNC: 9.1 MG/DL
CHLORIDE SERPL-SCNC: 107 MMOL/L
CO2 SERPL-SCNC: 27 MMOL/L
CREAT SERPL-MCNC: 1.39 MG/DL
EGFRCR SERPLBLD CKD-EPI 2021: 56 ML/MIN/1.73M2
GLUCOSE SERPL-MCNC: 115 MG/DL
POTASSIUM SERPL-SCNC: 3.8 MMOL/L
PROT SERPL-MCNC: 6.6 G/DL
SODIUM SERPL-SCNC: 146 MMOL/L
URATE SERPL-MCNC: 5.3 MG/DL

## 2025-08-27 ENCOUNTER — APPOINTMENT (OUTPATIENT)
Dept: UROLOGY | Facility: CLINIC | Age: 65
End: 2025-08-27

## 2025-09-02 ENCOUNTER — RX RENEWAL (OUTPATIENT)
Age: 65
End: 2025-09-02

## 2025-09-16 ENCOUNTER — APPOINTMENT (OUTPATIENT)
Dept: UROLOGY | Facility: CLINIC | Age: 65
End: 2025-09-16